# Patient Record
Sex: FEMALE | Race: WHITE | NOT HISPANIC OR LATINO | Employment: UNEMPLOYED | URBAN - METROPOLITAN AREA
[De-identification: names, ages, dates, MRNs, and addresses within clinical notes are randomized per-mention and may not be internally consistent; named-entity substitution may affect disease eponyms.]

---

## 2021-10-16 LAB — GROUP B STREPTOCOCCUS (EXTERNAL): NEGATIVE

## 2021-10-20 LAB — HEPATITIS C ANTIBODY (EXTERNAL): NEGATIVE

## 2021-11-06 ENCOUNTER — HOSPITAL ENCOUNTER (INPATIENT)
Facility: CLINIC | Age: 21
LOS: 1 days | Discharge: HOME | End: 2021-11-07
Attending: ADVANCED PRACTICE MIDWIFE | Admitting: MIDWIFE
Payer: COMMERCIAL

## 2021-11-06 ENCOUNTER — ANESTHESIA EVENT (OUTPATIENT)
Dept: OBGYN | Facility: CLINIC | Age: 21
End: 2021-11-06
Payer: COMMERCIAL

## 2021-11-06 ENCOUNTER — ANESTHESIA (OUTPATIENT)
Dept: OBGYN | Facility: CLINIC | Age: 21
End: 2021-11-06
Payer: COMMERCIAL

## 2021-11-06 PROBLEM — Z34.90 PREGNANCY: Status: ACTIVE | Noted: 2021-11-06

## 2021-11-06 PROBLEM — Z36.89 ENCOUNTER FOR TRIAGE IN PREGNANT PATIENT: Status: ACTIVE | Noted: 2021-11-06

## 2021-11-06 LAB
ABO/RH(D): NORMAL
ALBUMIN MFR UR ELPH: <7 MG/DL
ALT SERPL W P-5'-P-CCNC: <9 U/L (ref 0–45)
ANTIBODY SCREEN: NEGATIVE
AST SERPL W P-5'-P-CCNC: 14 U/L (ref 0–40)
CREAT SERPL-MCNC: 0.69 MG/DL (ref 0.6–1.1)
CREAT UR-MCNC: 21 MG/DL
ERYTHROCYTE [DISTWIDTH] IN BLOOD BY AUTOMATED COUNT: 14.4 % (ref 10–15)
GFR SERPL CREATININE-BSD FRML MDRD: >90 ML/MIN/1.73M2
HBV SURFACE AG SERPL QL IA: NONREACTIVE
HCT VFR BLD AUTO: 37 % (ref 35–47)
HGB BLD-MCNC: 12.3 G/DL (ref 11.7–15.7)
HIV 1+2 AB+HIV1 P24 AG SERPL QL IA: NEGATIVE
MCH RBC QN AUTO: 34.5 PG (ref 26.5–33)
MCHC RBC AUTO-ENTMCNC: 33.2 G/DL (ref 31.5–36.5)
MCV RBC AUTO: 104 FL (ref 78–100)
PLATELET # BLD AUTO: 223 10E3/UL (ref 150–450)
PROT/CREAT 24H UR: NORMAL MG/G{CREAT}
RBC # BLD AUTO: 3.57 10E6/UL (ref 3.8–5.2)
RUPTURE OF FETAL MEMBRANES BY ROM PLUS: POSITIVE
SARS-COV-2 RNA RESP QL NAA+PROBE: NEGATIVE
SPECIMEN EXPIRATION DATE: NORMAL
WBC # BLD AUTO: 10.3 10E3/UL (ref 4–11)

## 2021-11-06 PROCEDURE — 250N000009 HC RX 250: Performed by: ANESTHESIOLOGY

## 2021-11-06 PROCEDURE — 87340 HEPATITIS B SURFACE AG IA: CPT | Performed by: MIDWIFE

## 2021-11-06 PROCEDURE — 84460 ALANINE AMINO (ALT) (SGPT): CPT | Performed by: MIDWIFE

## 2021-11-06 PROCEDURE — 84156 ASSAY OF PROTEIN URINE: CPT | Performed by: MIDWIFE

## 2021-11-06 PROCEDURE — 84450 TRANSFERASE (AST) (SGOT): CPT | Performed by: MIDWIFE

## 2021-11-06 PROCEDURE — 370N000003 HC ANESTHESIA WARD SERVICE

## 2021-11-06 PROCEDURE — 3E033VJ INTRODUCTION OF OTHER HORMONE INTO PERIPHERAL VEIN, PERCUTANEOUS APPROACH: ICD-10-PCS | Performed by: MIDWIFE

## 2021-11-06 PROCEDURE — 722N000001 HC LABOR CARE VAGINAL DELIVERY SINGLE

## 2021-11-06 PROCEDURE — 120N000001 HC R&B MED SURG/OB

## 2021-11-06 PROCEDURE — 86780 TREPONEMA PALLIDUM: CPT | Performed by: MIDWIFE

## 2021-11-06 PROCEDURE — 86900 BLOOD TYPING SEROLOGIC ABO: CPT | Performed by: MIDWIFE

## 2021-11-06 PROCEDURE — C9803 HOPD COVID-19 SPEC COLLECT: HCPCS

## 2021-11-06 PROCEDURE — 250N000013 HC RX MED GY IP 250 OP 250 PS 637: Performed by: MIDWIFE

## 2021-11-06 PROCEDURE — 10907ZC DRAINAGE OF AMNIOTIC FLUID, THERAPEUTIC FROM PRODUCTS OF CONCEPTION, VIA NATURAL OR ARTIFICIAL OPENING: ICD-10-PCS | Performed by: MIDWIFE

## 2021-11-06 PROCEDURE — 87635 SARS-COV-2 COVID-19 AMP PRB: CPT | Performed by: MIDWIFE

## 2021-11-06 PROCEDURE — 250N000011 HC RX IP 250 OP 636: Performed by: ANESTHESIOLOGY

## 2021-11-06 PROCEDURE — 84112 EVAL AMNIOTIC FLUID PROTEIN: CPT | Performed by: MIDWIFE

## 2021-11-06 PROCEDURE — 82565 ASSAY OF CREATININE: CPT | Performed by: MIDWIFE

## 2021-11-06 PROCEDURE — 258N000003 HC RX IP 258 OP 636: Performed by: MIDWIFE

## 2021-11-06 PROCEDURE — 250N000009 HC RX 250: Performed by: MIDWIFE

## 2021-11-06 PROCEDURE — 250N000011 HC RX IP 250 OP 636: Performed by: MIDWIFE

## 2021-11-06 PROCEDURE — 86762 RUBELLA ANTIBODY: CPT | Performed by: MIDWIFE

## 2021-11-06 PROCEDURE — 36415 COLL VENOUS BLD VENIPUNCTURE: CPT | Performed by: MIDWIFE

## 2021-11-06 PROCEDURE — 87389 HIV-1 AG W/HIV-1&-2 AB AG IA: CPT | Performed by: MIDWIFE

## 2021-11-06 PROCEDURE — 85027 COMPLETE CBC AUTOMATED: CPT | Performed by: MIDWIFE

## 2021-11-06 RX ORDER — METOCLOPRAMIDE HYDROCHLORIDE 5 MG/ML
10 INJECTION INTRAMUSCULAR; INTRAVENOUS EVERY 6 HOURS PRN
Status: DISCONTINUED | OUTPATIENT
Start: 2021-11-06 | End: 2021-11-06 | Stop reason: HOSPADM

## 2021-11-06 RX ORDER — NALOXONE HYDROCHLORIDE 0.4 MG/ML
0.2 INJECTION, SOLUTION INTRAMUSCULAR; INTRAVENOUS; SUBCUTANEOUS
Status: DISCONTINUED | OUTPATIENT
Start: 2021-11-06 | End: 2021-11-06 | Stop reason: HOSPADM

## 2021-11-06 RX ORDER — METOCLOPRAMIDE 10 MG/1
10 TABLET ORAL EVERY 6 HOURS PRN
Status: DISCONTINUED | OUTPATIENT
Start: 2021-11-06 | End: 2021-11-06 | Stop reason: HOSPADM

## 2021-11-06 RX ORDER — OXYTOCIN 10 [USP'U]/ML
10 INJECTION, SOLUTION INTRAMUSCULAR; INTRAVENOUS
Status: DISCONTINUED | OUTPATIENT
Start: 2021-11-06 | End: 2021-11-07 | Stop reason: HOSPADM

## 2021-11-06 RX ORDER — PROCHLORPERAZINE 25 MG
25 SUPPOSITORY, RECTAL RECTAL EVERY 12 HOURS PRN
Status: DISCONTINUED | OUTPATIENT
Start: 2021-11-06 | End: 2021-11-06 | Stop reason: HOSPADM

## 2021-11-06 RX ORDER — OXYTOCIN/0.9 % SODIUM CHLORIDE 30/500 ML
100-340 PLASTIC BAG, INJECTION (ML) INTRAVENOUS CONTINUOUS PRN
Status: DISCONTINUED | OUTPATIENT
Start: 2021-11-06 | End: 2021-11-07 | Stop reason: HOSPADM

## 2021-11-06 RX ORDER — NALOXONE HYDROCHLORIDE 0.4 MG/ML
0.4 INJECTION, SOLUTION INTRAMUSCULAR; INTRAVENOUS; SUBCUTANEOUS
Status: DISCONTINUED | OUTPATIENT
Start: 2021-11-06 | End: 2021-11-06 | Stop reason: HOSPADM

## 2021-11-06 RX ORDER — NALBUPHINE HYDROCHLORIDE 10 MG/ML
2.5-5 INJECTION, SOLUTION INTRAMUSCULAR; INTRAVENOUS; SUBCUTANEOUS EVERY 6 HOURS PRN
Status: DISCONTINUED | OUTPATIENT
Start: 2021-11-06 | End: 2021-11-06

## 2021-11-06 RX ORDER — KETOROLAC TROMETHAMINE 30 MG/ML
30 INJECTION, SOLUTION INTRAMUSCULAR; INTRAVENOUS
Status: DISCONTINUED | OUTPATIENT
Start: 2021-11-06 | End: 2021-11-07 | Stop reason: HOSPADM

## 2021-11-06 RX ORDER — ACETAMINOPHEN 325 MG/1
650 TABLET ORAL EVERY 4 HOURS PRN
Status: DISCONTINUED | OUTPATIENT
Start: 2021-11-06 | End: 2021-11-06 | Stop reason: HOSPADM

## 2021-11-06 RX ORDER — LIDOCAINE 40 MG/G
CREAM TOPICAL
Status: DISCONTINUED | OUTPATIENT
Start: 2021-11-06 | End: 2021-11-06 | Stop reason: HOSPADM

## 2021-11-06 RX ORDER — NALOXONE HYDROCHLORIDE 0.4 MG/ML
0.4 INJECTION, SOLUTION INTRAMUSCULAR; INTRAVENOUS; SUBCUTANEOUS
Status: DISCONTINUED | OUTPATIENT
Start: 2021-11-06 | End: 2021-11-07 | Stop reason: HOSPADM

## 2021-11-06 RX ORDER — MODIFIED LANOLIN
OINTMENT (GRAM) TOPICAL
Status: DISCONTINUED | OUTPATIENT
Start: 2021-11-06 | End: 2021-11-07 | Stop reason: HOSPADM

## 2021-11-06 RX ORDER — OXYTOCIN/0.9 % SODIUM CHLORIDE 30/500 ML
1-24 PLASTIC BAG, INJECTION (ML) INTRAVENOUS CONTINUOUS
Status: DISCONTINUED | OUTPATIENT
Start: 2021-11-06 | End: 2021-11-06 | Stop reason: HOSPADM

## 2021-11-06 RX ORDER — NALBUPHINE HYDROCHLORIDE 10 MG/ML
2.5-5 INJECTION, SOLUTION INTRAMUSCULAR; INTRAVENOUS; SUBCUTANEOUS EVERY 6 HOURS PRN
Status: DISCONTINUED | OUTPATIENT
Start: 2021-11-06 | End: 2021-11-07 | Stop reason: HOSPADM

## 2021-11-06 RX ORDER — ACETAMINOPHEN 325 MG/1
650 TABLET ORAL EVERY 4 HOURS PRN
Status: DISCONTINUED | OUTPATIENT
Start: 2021-11-06 | End: 2021-11-07 | Stop reason: HOSPADM

## 2021-11-06 RX ORDER — DOCUSATE SODIUM 100 MG/1
100 CAPSULE, LIQUID FILLED ORAL DAILY
Status: DISCONTINUED | OUTPATIENT
Start: 2021-11-06 | End: 2021-11-07 | Stop reason: HOSPADM

## 2021-11-06 RX ORDER — ONDANSETRON 4 MG/1
4 TABLET, ORALLY DISINTEGRATING ORAL EVERY 6 HOURS PRN
Status: DISCONTINUED | OUTPATIENT
Start: 2021-11-06 | End: 2021-11-06 | Stop reason: HOSPADM

## 2021-11-06 RX ORDER — HYDROCORTISONE 2.5 %
CREAM (GRAM) TOPICAL 3 TIMES DAILY PRN
Status: DISCONTINUED | OUTPATIENT
Start: 2021-11-06 | End: 2021-11-07 | Stop reason: HOSPADM

## 2021-11-06 RX ORDER — SODIUM CHLORIDE, SODIUM LACTATE, POTASSIUM CHLORIDE, CALCIUM CHLORIDE 600; 310; 30; 20 MG/100ML; MG/100ML; MG/100ML; MG/100ML
10-125 INJECTION, SOLUTION INTRAVENOUS CONTINUOUS
Status: DISCONTINUED | OUTPATIENT
Start: 2021-11-06 | End: 2021-11-07 | Stop reason: HOSPADM

## 2021-11-06 RX ORDER — BISACODYL 10 MG
10 SUPPOSITORY, RECTAL RECTAL DAILY PRN
Status: DISCONTINUED | OUTPATIENT
Start: 2021-11-06 | End: 2021-11-07 | Stop reason: HOSPADM

## 2021-11-06 RX ORDER — IBUPROFEN 800 MG/1
800 TABLET, FILM COATED ORAL EVERY 6 HOURS PRN
Status: DISCONTINUED | OUTPATIENT
Start: 2021-11-06 | End: 2021-11-07 | Stop reason: HOSPADM

## 2021-11-06 RX ORDER — OXYCODONE HYDROCHLORIDE 5 MG/1
5 TABLET ORAL EVERY 4 HOURS PRN
Status: DISCONTINUED | OUTPATIENT
Start: 2021-11-06 | End: 2021-11-07 | Stop reason: HOSPADM

## 2021-11-06 RX ORDER — LIDOCAINE HYDROCHLORIDE AND EPINEPHRINE 15; 5 MG/ML; UG/ML
INJECTION, SOLUTION EPIDURAL PRN
Status: DISCONTINUED | OUTPATIENT
Start: 2021-11-06 | End: 2021-11-06

## 2021-11-06 RX ORDER — ONDANSETRON 2 MG/ML
4 INJECTION INTRAMUSCULAR; INTRAVENOUS EVERY 6 HOURS PRN
Status: DISCONTINUED | OUTPATIENT
Start: 2021-11-06 | End: 2021-11-06 | Stop reason: HOSPADM

## 2021-11-06 RX ORDER — NALOXONE HYDROCHLORIDE 0.4 MG/ML
0.2 INJECTION, SOLUTION INTRAMUSCULAR; INTRAVENOUS; SUBCUTANEOUS
Status: DISCONTINUED | OUTPATIENT
Start: 2021-11-06 | End: 2021-11-07 | Stop reason: HOSPADM

## 2021-11-06 RX ORDER — FENTANYL CITRATE 50 UG/ML
50-100 INJECTION, SOLUTION INTRAMUSCULAR; INTRAVENOUS
Status: DISCONTINUED | OUTPATIENT
Start: 2021-11-06 | End: 2021-11-06 | Stop reason: HOSPADM

## 2021-11-06 RX ORDER — FENTANYL CITRATE-0.9 % NACL/PF 10 MCG/ML
100 PLASTIC BAG, INJECTION (ML) INTRAVENOUS EVERY 5 MIN PRN
Status: DISCONTINUED | OUTPATIENT
Start: 2021-11-06 | End: 2021-11-06

## 2021-11-06 RX ORDER — FENTANYL/ROPIVACAINE/NS/PF 2MCG/ML-.1
PLASTIC BAG, INJECTION (ML) EPIDURAL
Status: DISCONTINUED | OUTPATIENT
Start: 2021-11-06 | End: 2021-11-06

## 2021-11-06 RX ORDER — PROCHLORPERAZINE MALEATE 10 MG
10 TABLET ORAL EVERY 6 HOURS PRN
Status: DISCONTINUED | OUTPATIENT
Start: 2021-11-06 | End: 2021-11-06 | Stop reason: HOSPADM

## 2021-11-06 RX ORDER — IBUPROFEN 600 MG/1
600 TABLET, FILM COATED ORAL
Status: DISCONTINUED | OUTPATIENT
Start: 2021-11-06 | End: 2021-11-07 | Stop reason: HOSPADM

## 2021-11-06 RX ORDER — FENTANYL CITRATE-0.9 % NACL/PF 10 MCG/ML
100 PLASTIC BAG, INJECTION (ML) INTRAVENOUS EVERY 5 MIN PRN
Status: DISCONTINUED | OUTPATIENT
Start: 2021-11-06 | End: 2021-11-06 | Stop reason: HOSPADM

## 2021-11-06 RX ORDER — BUPIVACAINE HYDROCHLORIDE 2.5 MG/ML
INJECTION, SOLUTION EPIDURAL; INFILTRATION; INTRACAUDAL PRN
Status: DISCONTINUED | OUTPATIENT
Start: 2021-11-06 | End: 2021-11-06

## 2021-11-06 RX ORDER — MISOPROSTOL 200 UG/1
TABLET ORAL
Status: DISCONTINUED
Start: 2021-11-06 | End: 2021-11-06 | Stop reason: WASHOUT

## 2021-11-06 RX ADMIN — DOCUSATE SODIUM 100 MG: 100 CAPSULE, LIQUID FILLED ORAL at 20:30

## 2021-11-06 RX ADMIN — Medication: at 15:21

## 2021-11-06 RX ADMIN — SODIUM CHLORIDE, POTASSIUM CHLORIDE, SODIUM LACTATE AND CALCIUM CHLORIDE 125 ML/HR: 600; 310; 30; 20 INJECTION, SOLUTION INTRAVENOUS at 15:20

## 2021-11-06 RX ADMIN — KETOROLAC TROMETHAMINE 30 MG: 30 INJECTION, SOLUTION INTRAMUSCULAR at 18:13

## 2021-11-06 RX ADMIN — LIDOCAINE HYDROCHLORIDE,EPINEPHRINE BITARTRATE 3 ML: 15; .005 INJECTION, SOLUTION EPIDURAL; INFILTRATION; INTRACAUDAL; PERINEURAL at 15:27

## 2021-11-06 RX ADMIN — BUPIVACAINE HYDROCHLORIDE 5 ML: 2.5 INJECTION, SOLUTION EPIDURAL; INFILTRATION; INTRACAUDAL at 16:23

## 2021-11-06 RX ADMIN — SODIUM CHLORIDE, POTASSIUM CHLORIDE, SODIUM LACTATE AND CALCIUM CHLORIDE 1000 ML: 600; 310; 30; 20 INJECTION, SOLUTION INTRAVENOUS at 14:23

## 2021-11-06 RX ADMIN — Medication 340 ML/HR: at 17:14

## 2021-11-06 RX ADMIN — LIDOCAINE HYDROCHLORIDE,EPINEPHRINE BITARTRATE 3 ML: 15; .005 INJECTION, SOLUTION EPIDURAL; INFILTRATION; INTRACAUDAL; PERINEURAL at 15:25

## 2021-11-06 RX ADMIN — IBUPROFEN 800 MG: 800 TABLET ORAL at 20:31

## 2021-11-06 ASSESSMENT — MIFFLIN-ST. JEOR: SCORE: 1713.43

## 2021-11-06 NOTE — PROGRESS NOTES
Labor Progress Note:        Assessment:     at 39w6d  Active labor  SROM and AROM of forebag (clear)    Plan:   -Prepare for epidural anesthesia  -Routine CNM support & management.   -Reevaluate progress in 2-3 hours or sooner with a change in status.   -Anticipate progress and NSVB.     Subjective:  Radha Chahal is having difficulty coping with ctx's and requests epidural. MDA currently @ bedside. Pt has been changing positions frequently over past several hours; hands/knees, lunge, side lying release.  Spouse- Bran and pt's mother at bedside and supportive.       Objective:  B/P: 113/62, T: 98.2, P: 67, R: 16     FHR:Baseline: 140 bpm, Variability: Moderate (6 - 25 bpm), Accelerations: present and Decelerations: Variable: none    Uterine contractions:TocoFrequency: Every 2-3 minutes, Duration: 60-90 seconds and Intensity: strong    SVE: 5 cm/90%/0, posterior, soft  AROM of forebag @  1411, clear fluid    Provider:  Carmel Bonilla CNM                    [facility]

## 2021-11-06 NOTE — PROGRESS NOTES
CNM completed speculum exam and visualized pooling.  Pt admitted to hospital for SROM.  CN will place orders for pitocin augmentation.  Pt would like to eat breakfast and tub before pitocin is started.

## 2021-11-06 NOTE — L&D DELIVERY NOTE
Vaginal Delivery Note    Name: Radha Chahal  : 2000  MRN: [id number]    PRE DELIVERY DIAGNOSIS  1) 21 year old  1 Para 0 at 39w6d      BROCK at 36 wk  Interested in WB  Elevated gct (142), normal 3 hr gtt  GBS negative  Low lying placenta- resolved  SROM- clear fluid, 0500 21  Hard of hearing; both ears.  Pt would like outpatient evaluation PP.  POST DELIVERY DIAGNOSIS  1) 21 year old  @ 39w6d  2) Delivery of a viable infant weighing   via     YOB: 2022     Birth Time: 5:04 PM       Augmentation Yes              Indication:   Induction No                      Indication:     Monitors: External     GBS: Negative    ROM: SROM  Fluid Type: Clear    Labor Analgesia/Anesthesia:Nitrous oxide and Epidural    Cord pH obtained: No  Placenta Date/Time: 2021  5:10 PM   Placenta submitted to Pathology: No    Description of procedure:   21 year old  with [facility] and pregnancy complicated by See list above presented to L&D with spontaneous rupture of membranes.  Her hospital course was uncomplicated.  Patient progressed to complete with AROM of forebag   Shoulder Dystocia No  Operative Vaginal Delivery No  Infant spontaneously delivered over an intact perineum and bilateral vaginal sidewall laceration.  It was repaired in the normal fashion using epidural anesthesia using 4-0 vicryl.  Infant delivered in DEBORAH position.  Nuchal cord No        Placenta spontaneously delivered: 2021  5:10 PM  grossly intact with 3 vessel cord.  Infant:  Live, vigorous infant  was handed to mom.    Delivery was complicated by nothing Interventions included fundal massage and pitocin.    Delivery QBL (mL): 150    Mother and Infant stable.    Carmel Bonilla CNM    2021 5:43 PM

## 2021-11-06 NOTE — ANESTHESIA PREPROCEDURE EVALUATION
Anesthesia Pre-Procedure Evaluation    Patient: Radha Chahal   MRN: [id number] : 2000        Preoperative Diagnosis: * No pre-op diagnosis entered *    Procedure : * No procedures listed *          Past Medical History:   Diagnosis Date     Hard of hearing     pt states she has been hard of hearing for 3 years      History reviewed. No pertinent surgical history.   No Known Allergies   Social History     Tobacco Use     Smoking status: Never Smoker     Smokeless tobacco: Never Used   Substance Use Topics     Alcohol use: Never      Wt Readings from Last 1 Encounters:   21 82.6 kg (182 lb)        Anesthesia Evaluation   Pt has had prior anesthetic.     No history of anesthetic complications       ROS/MED HX  ENT/Pulmonary:  - neg pulmonary ROS     Neurologic:  - neg neurologic ROS     Cardiovascular:  - neg cardiovascular ROS     METS/Exercise Tolerance:     Hematologic:  - neg hematologic  ROS     Musculoskeletal:  - neg musculoskeletal ROS     GI/Hepatic:  - neg GI/hepatic ROS     Renal/Genitourinary:  - neg Renal ROS     Endo:  - neg endo ROS     Psychiatric/Substance Use:  - neg psychiatric ROS     Infectious Disease:  - neg infectious disease ROS     Malignancy:  - neg malignancy ROS     Other:      (+) Possibly pregnant (Currently pregnant ), ,         Physical Exam    Airway  airway exam normal      Mallampati: II   TM distance: > 3 FB   Neck ROM: full   Mouth opening: > 3 cm    Respiratory Devices and Support         Dental  no notable dental history         Cardiovascular   cardiovascular exam normal       Rhythm and rate: regular and normal     Pulmonary   pulmonary exam normal        breath sounds clear to auscultation           OUTSIDE LABS:  CBC:   Lab Results   Component Value Date    WBC 10.3 2021    HGB 12.3 2021    HCT 37.0 2021     2021     BMP:   Lab Results   Component Value Date    CR 0.69 2021     COAGS: No results found for: PTT, INR,  FIBR  POC: No results found for: BGM, HCG, HCGS  HEPATIC:   Lab Results   Component Value Date    ALT <9 11/06/2021    AST 14 11/06/2021     OTHER: No results found for: PH, LACT, A1C, HANY, PHOS, MAG, LIPASE, AMYLASE, TSH, T4, T3, CRP, SED    Anesthesia Plan    ASA Status:  2   NPO Status:  NPO Appropriate    Anesthesia Type: Epidural.              Consents    Anesthesia Plan(s) and associated risks, benefits, and realistic alternatives discussed. Questions answered and patient/representative(s) expressed understanding.     - Discussed with:  Patient         Postoperative Care            Comments:                KAYLA GALLARDO MD

## 2021-11-06 NOTE — ANESTHESIA PROCEDURE NOTES
Epidural catheter Procedure Note    Pre-Procedure   Staff -        Anesthesiologist:  Cedric Anna MD       Performed By: anesthesiologist       Location: OB       Procedure Start/Stop Times: 11/6/2021 3:20 PM and 11/6/2021 3:27 PM       Pre-Anesthestic Checklist: patient identified, IV checked, risks and benefits discussed, informed consent, monitors and equipment checked, pre-op evaluation, at physician/surgeon's request and post-op pain management  Timeout:       Correct Patient: Yes        Correct Procedure: Yes        Correct Site: Yes        Correct Position: Yes   Procedure Documentation  Procedure: epidural catheter       Patient Position: sitting       Patient Prep/Sterile Barriers: sterile gloves, mask       Skin prep: Chloraprep       Local skin infiltrated with 3 mL of 1% lidocaine.        Insertion Site: L3-4. (midline approach).       Technique: LORT air        DERIK at 7 cm.       Needle type: Delphix.       Needle Gauge: 18.        Needle Length (Inches): 3.5        Catheter: 20 G.         Catheter threaded easily.         4.5 cm epidural space.         Threaded 11.5 cm at skin.         # of attempts: 1 and  # of redirects:  0    Assessment/Narrative         Paresthesias: No.      Test dose of 3 mL lidocaine 1.5% w/ 1:200,000 epinephrine at.         Test dose negative, 3 minutes after injection, for signs of intravascular, subdural, or intrathecal injection.       Insertion/Infusion Method: LORT air       No aspiration negative for Heme or CSF via Epidural Catheter.

## 2021-11-06 NOTE — PROGRESS NOTES
Bridgett is laboring in bathtub.  Reports feeling stronger ctx's.  Declines Pitocin at this time.  Will reassess in 1-2 hr.

## 2021-11-06 NOTE — PROGRESS NOTES
Spoke with KARLA Bonilla, she is on her way to hospital.  Plan to complete a rom plus and a VE. Have pt drink fluids.

## 2021-11-06 NOTE — PROGRESS NOTES
Labor Progress Note:        Assessment:     at 39w6d  SROM, 0500, clear  Moving into active labor      Plan:   -Routine CNM support & management.   -Reevaluate progress in 2-3 hours or sooner with a change in status.   -Anticipate progress and NSVB.     Subjective:  Radha Chahal is coping well with contractions. She declines VE or Pitocin augmentation at this time. Discussed check in 2-3 hr and she is agreeable with this.  Alexandra desires a natural childbirth/WB.  She is aware that the WB room is not available. She has been in and out of the tub in room several times today and is receiving good relief from water.  She is breathing through ctx's and reports that ctx's are getting stronger and closer together. Birth plan reviewed with couple    Objective:  B/P: 117/90, T: 97.7, P: 67, R: 16     FHR:Baseline: Via doppler- , no decles noted    Uterine contractions:Ctx's q 2-3 min, x 60-80 sec, moderate by palpation    SVE:deferred    Provider:  Carmel Bonilla CNM                    [facility]

## 2021-11-06 NOTE — PROGRESS NOTES
Pt doing well. She states ctx's feel stronger. She is in the tub, breathing through ctx's which palpate moderate in strength Q 3-5 mins. FHT's via doppler before during and after a ctx, baseline 145, acceleration up to 160 noted, no decels noted in FHR during auscultation.

## 2021-11-06 NOTE — PROGRESS NOTES
RN at bedside for 15 minutes following Nitrous Oxide 50/50 inhalation initiation. Patient is observed using the equipment appropriately. Patient appears to be coping with labor. Patient is free of side effects.    Juana Combs RN

## 2021-11-06 NOTE — PLAN OF CARE
Problem: Adult Inpatient Plan of Care  Goal: Plan of Care Review  Outcome: Improving     Problem: Adult Inpatient Plan of Care  Goal: Optimal Comfort and Wellbeing  Outcome: Improving

## 2021-11-06 NOTE — H&P
Date: 2021  Time: 9:15 AM    Admission H&P  Radha Chahal,  2000, MRN [id number]    [unfilled]  Encounter for triage in pregnant patient [Z36.89]    PCP: No Ref-Primary, Physician, None          Extended Emergency Contact Information  Primary Emergency Contact: ERIN CHAHAL  Home Phone: [phone]  Mobile Phone: [phone]  Relation: Spouse       Chief Complaint: pregnancy       HPI:      Radha Chahal, is a 21 year old,  at 39w6d, LMP 1/3/21, Estimated Date of Delivery: 2021, confirmed by ultrasound 1st trimester at [facility], admitted to [facility] on 2021 at approximately 0845 secondary to: SROM, ROM plus positive, ctx's q 6-7 min. Pt reports mild HA x 2 days. Denies vision changes or RUQ pain.   Prenatal History:  Radha Chahal received care with [facility]. Her care was complicated by BROCK from [facility] ([facility] system) at 36 wk, Interested in WB, pt is hard of hearing in both ears (has never had consult or evaluation for this, started 3 yr ago).        Admission on 2021   Component Date Value Ref Range Status     Rupture of Fetal Membranes by ROM * 2021 Positive* Negative, Invalid, Suggest Repeat Final       Pertinent Radiology:            OB HISTORY  OB History    Para Term  AB Living   1 0 0 0 0 0   SAB TAB Ectopic Multiple Live Births   0 0 0 0 0      # Outcome Date GA Lbr Cash/2nd Weight Sex Delivery Anes PTL Lv   1 Current                  Medical History       Surgical History  She  has no past surgical history on file.       Social History  Reviewed, and she  reports that she has never smoked. She has never used smokeless tobacco. She reports that she does not drink alcohol and does not use drugs.  Partner: Lovell        Family History  Reviewed, and family history is not on file.          No Known Allergies   No medications prior to admission.        Review of Systems:  Pertinent items are noted in HPI.  Patient  "denies  vision changes and/or RUQ pain.    Physical Exam:  Temp:  [97.7  F (36.5  C)] 97.7  F (36.5  C)  Pulse:  [67] 67  Resp:  [16] 16  BP: (126)/(73) 126/73    /73   Pulse 67   Temp 97.7  F (36.5  C) (Oral)   Resp 16   Ht 1.854 m (6' 1\")   Wt 82.6 kg (182 lb)   BMI 24.01 kg/m      Height: 6' 1\"  General appearance:  comfortable  Psych: AAO x3  Skin: Pink, warm & dry  HEENT: unremarkable  Cardiovascular:  RRR, S1, S2, no extra sounds or murmurs  Respiratory:  breath sounds CTA bilaterally, anteriorly and posteriorly  Abdomen: soft, NT, gravid  Leopolds: cephalic         EFW:<4500 grams (AGA)     FHR:Baseline: 130 bpm, Variability: Moderate (6 - 25 bpm), Accelerations: present and Decelerations: Absent    Uterine contractions: Frequency: Every 5-7 minutes, Duration: 60-80 seconds and Intensity: mild    SVE: 2 cm/60%/-1, mid, soft, stretchy  SROM- clear, blood tinged fluid, 0500 21    Extremeties: trace edema        Membrane Status:     Intact           Notable AP/IP factors to date:  BROCK at 36 wk  Interested in WB  Elevated gct (142), normal 3 hr gtt  GBS negative  Low lying placenta- resolved  SROM- clear fluid, 0500 21  Hard of hearing; both ears    Impression:  Radha Chahal is a 21 year old year old  at 39w6d weeks, Category 1 FHTs, SROM, not in active labor.      Plan:  1.  [facility]  2.  Encourage position changes  3.  Labor support PRN.  4.  Continuous/Intermittent fetal monitoring  5.  Consider active management of 3rd stage of labor  6.  Anticipate Spontaneous vaginal birth  7. Administer low-dose Pitocin  8. Slightly elevated Bps,  Will drawn PIH panel    [facility] available for consultation and collaboration as needed. Dr. Russell updated and available PRN.       Provider: Carmel Bonilla CNM  "

## 2021-11-06 NOTE — PROGRESS NOTES
pt of [facility] here complaining of contractions and light bleeding for 2.5 hours.  Cramping began yesterday evening.  Pt placed on monitor, cat 1 FHR with accelerations, contractions every 5-7 minutes.

## 2021-11-07 VITALS
BODY MASS INDEX: 24.65 KG/M2 | HEIGHT: 72 IN | TEMPERATURE: 98 F | SYSTOLIC BLOOD PRESSURE: 137 MMHG | WEIGHT: 182 LBS | OXYGEN SATURATION: 92 % | RESPIRATION RATE: 16 BRPM | HEART RATE: 80 BPM | DIASTOLIC BLOOD PRESSURE: 76 MMHG

## 2021-11-07 LAB — T PALLIDUM AB SER QL: NONREACTIVE

## 2021-11-07 PROCEDURE — 250N000013 HC RX MED GY IP 250 OP 250 PS 637: Performed by: MIDWIFE

## 2021-11-07 RX ORDER — DOCUSATE SODIUM 100 MG/1
100 CAPSULE, LIQUID FILLED ORAL DAILY
COMMUNITY
Start: 2021-11-08 | End: 2023-01-24

## 2021-11-07 RX ORDER — IBUPROFEN 600 MG/1
600 TABLET, FILM COATED ORAL
COMMUNITY
Start: 2021-11-07 | End: 2021-12-09

## 2021-11-07 RX ORDER — ACETAMINOPHEN 325 MG/1
650 TABLET ORAL EVERY 4 HOURS PRN
COMMUNITY
Start: 2021-11-07 | End: 2021-12-09

## 2021-11-07 RX ADMIN — Medication: at 17:19

## 2021-11-07 RX ADMIN — ACETAMINOPHEN 650 MG: 325 TABLET ORAL at 12:31

## 2021-11-07 RX ADMIN — DOCUSATE SODIUM 100 MG: 100 CAPSULE, LIQUID FILLED ORAL at 08:18

## 2021-11-07 RX ADMIN — ACETAMINOPHEN 650 MG: 325 TABLET ORAL at 00:31

## 2021-11-07 RX ADMIN — IBUPROFEN 800 MG: 800 TABLET ORAL at 12:31

## 2021-11-07 RX ADMIN — ACETAMINOPHEN 650 MG: 325 TABLET ORAL at 08:18

## 2021-11-07 RX ADMIN — IBUPROFEN 800 MG: 800 TABLET ORAL at 02:33

## 2021-11-07 RX ADMIN — IBUPROFEN 800 MG: 800 TABLET ORAL at 18:44

## 2021-11-07 RX ADMIN — IBUPROFEN 800 MG: 800 TABLET ORAL at 06:35

## 2021-11-07 NOTE — PROGRESS NOTES
"Vaginal Delivery Postpartum Day 1    Patient Name:  Radha Chahal  :      2000  MRN:      [id number]      Assessment:  Normal postpartum course. Desires discharge home today.    Plan:  Continue current care. Discharge at 24 hours.     Subjective:  The patient feels well:  Voiding without difficulty, lochia normal, tolerating normal diet, and passing flatus.  Pain is well controlled with current medications.  The patient has no emotional concerns.  The baby is well and being fed by breast.    Objective:  /67   Pulse 67   Temp 97.8  F (36.6  C) (Oral)   Resp 16   Ht 1.854 m (6' 1\")   Wt 82.6 kg (182 lb)   SpO2 92%   Breastfeeding Unknown   BMI 24.01 kg/m    Patient Vitals for the past 24 hrs:   BP Temp Temp src SpO2   21 0235 137/67 -- -- --   21 2212 123/69 -- -- --   21 1904 136/61 -- -- --   21 1848 95/68 -- -- --   21 1833 129/78 -- -- --   21 1818 113/59 -- -- --   21 1802 120/65 -- -- --   21 1748 117/70 -- -- --   21 1733 117/71 -- -- --   21 1717 107/58 -- -- --   21 1710 111/64 -- -- --   21 1704 -- -- -- 92 %   21 1700 -- -- -- 97 %   21 1625 113/63 -- -- --   21 1622 125/62 -- -- --   21 1621 -- -- -- 98 %   21 1620 115/58 -- -- --   21 1616 -- -- -- 99 %   21 1615 118/63 -- -- --   21 1611 -- -- -- 97 %   11/06/21 1610 116/65 -- -- --   21 1606 -- -- -- 98 %   21 1601 -- -- -- 96 %   21 1600 -- -- -- 93 %   21 1556 -- -- -- 96 %   21 1554 -- -- -- 93 %   21 1551 -- -- -- 97 %   21 1550 119/56 -- -- --   21 1546 -- -- -- 97 %   21 1545 127/82 -- -- --   21 1542 125/65 -- -- --   21 1541 -- -- -- 97 %   21 1540 119/67 -- -- --   21 1538 123/64 -- -- --   21 1536 110/58 97.8  F (36.6  C) Oral 97 %   21 1534 124/67 -- -- --   21 1532 123/76 -- -- --   21 1531 -- -- -- " 100 %   21 1530 122/74 -- -- --   21 1528 119/80 -- -- --   21 1526 116/79 -- -- 100 %   21 1524 126/77 -- -- --   21 1522 138/80 -- -- --   21 1521 -- -- -- 100 %   21 1520 128/82 -- -- --   21 1518 128/83 -- -- --   21 1516 -- -- -- 100 %   21 1511 -- -- -- 100 %   21 1409 -- 98.2  F (36.8  C) Oral --   21 1347 113/62 -- -- --   21 1300 -- 98.2  F (36.8  C) Oral --       The amount and color of the lochia is appropriate for the duration of recovery.  The uterine fundus is firm.  Urinary output is adequate.     Lab Results   Component Value Date    AS Negative 2021    HEPBANG Nonreactive 2021    HGB 12.3 2021       There is no immunization history for the selected administration types on file for this patient.    Provider:  Clementine Mcfadden CNM    Date:  2021  Time:  12:45 PM    Patient Name:  Radha Chahal  :  2000  MRN:  [id number]

## 2021-11-07 NOTE — ANESTHESIA POSTPROCEDURE EVALUATION
Patient: Radha Chahal    Procedure: * No procedures listed *       Diagnosis:* No pre-op diagnosis entered *  Diagnosis Additional Information: No value filed.    Anesthesia Type:  Epidural    Note:  Disposition: Inpatient   Postop Pain Control: Uneventful            Sign Out: Well controlled pain   PONV: No   Neuro/Psych: Uneventful            Sign Out: Acceptable/Baseline neuro status   Airway/Respiratory: Uneventful            Sign Out: Acceptable/Baseline resp. status   CV/Hemodynamics: Uneventful            Sign Out: Acceptable CV status; No obvious hypovolemia; No obvious fluid overload   Other NRE: NONE   DID A NON-ROUTINE EVENT OCCUR? No    Event details/Postop Comments:  Patient denies point back tenderness, positional headache and issues with urination/ambulation            Last vitals:  Vitals Value Taken Time   BP     Temp     Pulse     Resp     SpO2         Electronically Signed By: KAYLA GALLARDO MD  November 7, 2021  4:09 PM

## 2021-11-07 NOTE — DISCHARGE SUMMARY
OB Discharge Summary      Date:  2021    Name:  Radha Chahal  :  2000  MRN:  [id number]      Admission Date:  2021  Delivery Date: 21  Gestational Age at Delivery:  39w6d  Discharge Date:  2021    Principal Diagnosis:    Patient Active Problem List   Diagnosis     Encounter for triage in pregnant patient     Pregnancy       Other Diagnosis:      Conditions complicating Pregnancy:  BROCK at 36w  Bilateral hearing loss    Procedure(s) Performed:      Indication for :  N/A  Indication for Induction:  N/A     Condition at Discharge:  Stable    Discharge Medications:      Review of your medicines      START taking      Dose / Directions   acetaminophen 325 MG tablet  Commonly known as: TYLENOL  Used for: Care and examination of lactating mother      Dose: 650 mg  Take 2 tablets (650 mg) by mouth every 4 hours as needed for mild pain or fever (greater than or equal to 38  C /100.4  F (oral) or 38.5  C/ 101.4  F (core).)  Refills: 0     docusate sodium 100 MG capsule  Commonly known as: COLACE  Used for: Care and examination of lactating mother      Dose: 100 mg  Start taking on: 2021  Take 1 capsule (100 mg) by mouth daily  Refills: 0     ibuprofen 600 MG tablet  Commonly known as: ADVIL/MOTRIN  Used for: Care and examination of lactating mother      Dose: 600 mg  Take 1 tablet (600 mg) by mouth once as needed for other (For mild to moderate pain.)  Refills: 0           Where to get your medicines      Some of these will need a paper prescription and others can be bought over the counter. Ask your nurse if you have questions.    You don't need a prescription for these medications    acetaminophen 325 MG tablet    docusate sodium 100 MG capsule    ibuprofen 600 MG tablet         Discharge Plan:    Follow up with /KARLA:  At 6 weeks with [facility] provider   Patient Instructions:      Physical activity: As tolerated. Nothing in the vagina for 6 weeks.    Diet:   Regular    Medication:  As above    Other:        Physician/CNM:  Clementine Mcfadden CNM    Name:  Radha Chahal  :  2000  MRN:  [id number]

## 2021-11-08 LAB
RUBV IGG SERPL QL IA: 1.94 INDEX
RUBV IGG SERPL QL IA: POSITIVE

## 2021-12-08 RX ORDER — MECLIZINE HYDROCHLORIDE 25 MG/1
25 TABLET ORAL 3 TIMES DAILY PRN
COMMUNITY
Start: 2021-06-18 | End: 2021-12-09

## 2021-12-08 RX ORDER — NIFEDIPINE 10 MG/1
CAPSULE ORAL
COMMUNITY
Start: 2021-09-21 | End: 2021-12-09

## 2021-12-08 NOTE — PROGRESS NOTES
"Assessment:   1.  4 1/2 week old infant gaining weight well on exclusive breastfeeding  2.  Some difficulty with latch after initial milk letdown;  Able to re-latch with use of nipple shield.  Good suck and milk transfer in office today  3.  Mother with good milk supply and strong letdown reflex  4.  Mother with postpartum depression, beginning treatment    Plan:   1.  To continue to nurse baby on cue, 8-12 times each day.  Feed on one side until baby finishes swallowing.  Once swallowing slows, use breast compression to encourage more swallowing, but once there is no more active swallowing, and baby is either sleeping, coming off the breast, or just \"nibbling,\" it is OK to use a finger to take baby off the breast and move to the other breast.  Do the same on the other side.  Offer both breasts at each feeding.  It is more important to watch the baby than the clock!   2.  Present your breast in the \"sandwich\" hold, compressing breast vertically and in line with baby's mouth, for baby to get a larger mouthful of breast and a deeper latch.  If it is very difficult for Eduardo to latch and he is becoming very upset, it is OK to use the nipple shield to help him get on the breast.  Once he is nursing well and the milk is flowing, you can experiment with removing the shield.  You could also try removing it for the second side, once he is not so hungry.  3.  For the fast milk letdown that makes it difficult for Eduardo to stay latched to the breast, try using the laid-back nursing position.  You can also use one hand to gently block some milk ducts during letdown and help baby more easily cope with flow.  You can also try taking baby off of breast when the strong milk letdown starts, and allow milk to flow out into burp cloth, re-latching baby after flow has slowed.  Demonstrated laid-back and sidelying positions today.  4.  When you would like to add some pumping, it is OK to do that.  Just pump so that you have enough for a " bottle for Eduardo, around 3-4 oz.  Avoid pumping more than that, because this will just increase your supply more than you need and make the fast milk letdown worse.  Pumping after the first morning feeding is productive for many people.  5.  Look into pump options;  You can get a pump here at the [facility] or with your midwives at [facility].  6.  Continue the Lexapro that you have started, and follow up as planned with your midwife.  Consider the therapy options that they discussed with you.  Provided with local resources.  7.  Covid-19 vaccination does provide the baby with antibodies to Covid-19 through breastmilk, and will continue to do so through duration of breastfeeding, so you are doing a good job keeping Eduardo safe!  8.  See pediatric provider as planned, and lactation as needed. If urgent help is needed, Monday through Friday you can call [phone] and one of our lactation consultants will get the message and respond; if you need a rapid response over a weekend or holiday, it is best to call your on-call maternity or pediatric provider.  Please feel free to schedule a return visit if the concern is more detailed;  telephone visits are also an option if you don't feel you need to be seen in person.    Subjective: Bridgett is here today because of concerns about fast letdown--she reports that baby Eduardo detaches from breast and become distressed to the point that she began using nipple shields to help slow the flow.  This has been helpful, but she would like to move away from using the shields.  She has tried drawing off some extra milk using a [employer] passive pump, but this was not helpful. She also states that baby Eduardo latches much more easily to the right side;  Left nipple has tendency to retract.  Baby was also diagnosed with oral thrush, for which she is using nystatin, and has also been putting some nystatin cream on her nipples.  She has not been having any pain or itching.     Bridgett  also shares that she is having a hard time emotionally in the postpartum period.  Difficulty sleeping and eating.  She denies any suicidal ideation or thoughts of self-harm, although she admits that she has had episodes of depression with thoughts like this in the past.  Has never had medication therapy, but has recently started antidepressant therapy (Lexapro), prescribed by her CNM at [facility].  She is just beginning her second week of treatment.  Has also been referred to a postpartum support organization by her CNM.    Bridgett is vaccinated for Covid-19. She did also have the disease in March 2021.    Hospital Course:Spontaneous labor and uncomplicated birth.      Mother's Relevant Med/Surg History:  Hard of hearing bilaterally (never officially diagnosed/evaluated), depression    Breast Surgery: none    Breastfeeding Goals: continued exclusive breastfeeding    Previous Breastfeeding Experience: first baby    Infant's name: Humza  Infant's bday: [birth year]  Gestational age: 39w6d  Infant's birth weight: 7 # 8 oz    Mode of delivery: vaginal  Pediatric Provider: Dr. Concepción Pickard, [place]. Bridgett gives her permission for today's note to be forwarded to Dr. Pickard.  DANA signed and filed in Bridgett's chart as Humza has no local active pediatric chart.    Discharge weight: 7 # 1.4 oz  Recent weight 12/2/21:  9 # 3 oz      Frequency and duration of feedings: every 2-3 hours  Swallows audible per mother: yes  Numbers of feedings in 24 hours: 10  Number urines per day: 9  Number of stools per day and their color: 4, yellow watery    Supplementation: no  Pumping: no    Objective/Physical exam:   Mother: Noticed breasts grew larger and areolas darkened during pregnancy and she noticed primary engorgement when her milk came in on day 2-3  Her nipples are everted bilaterally, but the left nipple tends to draw in slightly at base.  The areola is compressible, the breast is soft and full.     Sore nipples: no  EPDS:  16.      Assessment of infant: 34.52% Weight for age percentile   Age today: 4 1/2 weeks  Today's weight: 9 # 8.6 oz  Amount of milk transferred from LEFT side: 2.6 oz  Amount of milk transferred from RIGHT side: 1 oz    Baby has full flexion of arms and legs, normal tone, behavior is alert and active, respirations are normal, skin is normal, hydration is normal, jaw is normal size and alignment, palate is normal, frenulum is normal, baby can lateralize tongue, has adequate tongue lift, and tongue can protrude past bottom gum line.    Suck exam:  Baby did not suck on examining finger;  Hungry and very distressed    Baby thrush: some present on back of tongue  Jaundice: none     Feeding assessment: Baby can hold suction with tongue while at the breast for the first part of the feeding.  After coming off the right breast, Eduardo had difficulty re-latching, crying and showing hunger cues but was unable to grasp the breast.  Nipple shield applied and moved into laid-back position;  Able to latch and complete feeding.  Also required nipple shield for left breast, with more retractile nipple.    Alignment: The baby was flex relaxed. Baby's head was aligned with its trunk. Baby did face mother. Baby was in cross cradle, sidelying and laid-back positions today.   Areolar Grasp: Baby was able to open mouth wide. Baby's lips were not pursed. Baby's lips did flange outward. Tongue was visible over bottom gum. Baby had complete seal.     Areolar Compression: Baby made rhythmic motion. There were no clicking or smacking sounds. There was no severe nipple discomfort. Nipples appeared rounded after feeding.    Audible swallowing: Baby made quiet sounds of swallowing: There was an increase in frequency after milk ejection reflex. The milk ejection reflex is strong and milk supply is normal.     /75 (BP Location: Left arm, Patient Position: Sitting, Cuff Size: Adult Regular)   OB History    Para Term  AB Living   1  1 1 0 0 1   SAB IAB Ectopic Multiple Live Births   0 0 0 0 1      # Outcome Date GA Lbr Cash/2nd Weight Sex Delivery Anes PTL Lv   1 Term 21 39w6d 11:30 / 00:34 3.402 kg (7 lb 8 oz) M Vag-Spont EPI, Nitrous N ALBERT      Name: ANA COHN-DARIUSZ      Apgar1: 8  Apgar5: 9       Current Outpatient Medications:      docusate sodium (COLACE) 100 MG capsule, Take 1 capsule (100 mg) by mouth daily, Disp: , Rfl:      escitalopram (LEXAPRO) 5 MG tablet, Take 2 tablets by mouth daily Take one tab every day for one week, then take 2 tabs every day if tolerated, Disp: , Rfl:      NIFEdipine (PROCARDIA) 10 MG capsule, , Disp: , Rfl:      nystatin (MYCOSTATIN) 692085 UNIT/GM external cream, Apply 1 Application topically 4 times daily, Disp: , Rfl:   Past Medical History:   Diagnosis Date     Hard of hearing     pt states she has been hard of hearing for 3 years     No past surgical history on file.  No family history on file.    Time spent:  Chart review/prechartin min prior to day of service  Face-to-face visit:   60 min   Documentation:  15 min   Total time spent on day of service: 75 min    Ramya Mackay, KLAUDIA, CNM, IBCLC

## 2021-12-09 ENCOUNTER — ALLIED HEALTH/NURSE VISIT (OUTPATIENT)
Dept: MIDWIFE SERVICES | Facility: CLINIC | Age: 21
End: 2021-12-09
Payer: COMMERCIAL

## 2021-12-09 VITALS — SYSTOLIC BLOOD PRESSURE: 110 MMHG | DIASTOLIC BLOOD PRESSURE: 75 MMHG

## 2021-12-09 DIAGNOSIS — O92.79 OTHER DISORDERS OF LACTATION: ICD-10-CM

## 2021-12-09 PROBLEM — O47.03 PRETERM UTERINE CONTRACTIONS, ANTEPARTUM, THIRD TRIMESTER: Status: ACTIVE | Noted: 2021-09-23

## 2021-12-09 PROBLEM — U07.1 LAB TEST POSITIVE FOR DETECTION OF COVID-19 VIRUS: Status: ACTIVE | Noted: 2021-03-31

## 2021-12-09 PROCEDURE — 99205 OFFICE O/P NEW HI 60 MIN: CPT | Performed by: ADVANCED PRACTICE MIDWIFE

## 2021-12-09 RX ORDER — ESCITALOPRAM OXALATE 5 MG/1
2 TABLET ORAL DAILY
COMMUNITY
Start: 2021-11-30 | End: 2023-01-24

## 2021-12-09 RX ORDER — NYSTATIN 100000 U/G
1 CREAM TOPICAL 4 TIMES DAILY
COMMUNITY
Start: 2021-11-12 | End: 2023-01-24

## 2021-12-09 ASSESSMENT — EDINBURGH POSTNATAL DEPRESSION SCALE (EPDS)
I HAVE BEEN SO UNHAPPY THAT I HAVE HAD DIFFICULTY SLEEPING: NOT VERY OFTEN
I HAVE BEEN SO UNHAPPY THAT I HAVE BEEN CRYING: ONLY OCCASIONALLY
I HAVE LOOKED FORWARD WITH ENJOYMENT TO THINGS: DEFINITELY LESS THAN I USED TO
I HAVE BEEN ANXIOUS OR WORRIED FOR NO GOOD REASON: YES, SOMETIMES
THINGS HAVE BEEN GETTING ON TOP OF ME: YES, SOMETIMES I HAVEN'T BEEN COPING AS WELL AS USUAL
THE THOUGHT OF HARMING MYSELF HAS OCCURRED TO ME: NEVER
I HAVE BLAMED MYSELF UNNECESSARILY WHEN THINGS WENT WRONG: YES, SOME OF THE TIME
I HAVE BEEN ABLE TO LAUGH AND SEE THE FUNNY SIDE OF THINGS: DEFINITELY NOT SO MUCH NOW
TOTAL SCORE: 16
I HAVE FELT SAD OR MISERABLE: YES, QUITE OFTEN
I HAVE FELT SCARED OR PANICKY FOR NO GOOD REASON: YES, SOMETIMES

## 2021-12-09 NOTE — PATIENT INSTRUCTIONS
"  1.  To continue to nurse baby on cue, 8-12 times each day.  Feed on one side until baby finishes swallowing.  Once swallowing slows, use breast compression to encourage more swallowing, but once there is no more active swallowing, and baby is either sleeping, coming off the breast, or just \"nibbling,\" it is OK to use a finger to take baby off the breast and move to the other breast.  Do the same on the other side.  Offer both breasts at each feeding.  It is more important to watch the baby than the clock!   2.  Present your breast in the \"sandwich\" hold, compressing breast vertically and in line with baby's mouth, for baby to get a larger mouthful of breast and a deeper latch.  If it is very difficult for Eduardo to latch and he is becoming very upset, it is OK to use the nipple shield to help him get on the breast.  Once he is nursing well and the milk is flowing, you can experiment with removing the shield.  You could also try removing it for the second side, once he is not so hungry.  3.  For the fast milk letdown that makes it difficult for Eduardo to stay latched to the breast, try using the laid-back nursing position.  You can also use one hand to gently block some milk ducts during letdown and help baby more easily cope with flow.  You can also try taking baby off of breast when the strong milk letdown starts, and allow milk to flow out into burp cloth, re-latching baby after flow has slowed.  4.  When you would like to add some pumping, it is OK to do that.  Just pump so that you have enough for a bottle for Eduardo, around 3-4 oz.  Avoid pumping more than that, because this will just increase your supply more than you need and make the fast milk letdown worse.  Pumping after the first morning feeding is productive for many people.  5.  Look into pump options;  You can get a pump here at the [facility] or with your midwives at [facility] [place].  6.  Continue the Lexapro that you have started, and follow " up as planned with your midwife.  Consider the therapy options that they discussed with you.  7.  Covid-19 vaccination does provide the baby with antibodies to Covid-19 through breastmilk, and will continue to do so through duration of breastfeeding, so you are doing a good job keeping Eduardo safe!  8.  See pediatric provider as planned, and lactation as needed. If urgent help is needed, Monday through Friday you can call [phone] and one of our lactation consultants will get the message and respond; if you need a rapid response over a weekend or holiday, it is best to call your on-call maternity or pediatric provider.  Please feel free to schedule a return visit if the concern is more detailed;  telephone visits are also an option if you don't feel you need to be seen in person.        Good breastfeeding resources:    Websites:  www.[employer]  Https://www.[employer]/blog/    Books:   The Baby Book: Dr. Schwartz and Padma Rob  The Womanly Art of Breastfeeding by [facility] Hospital[facility]    __________________________    For weaning from nipple shield, try without the shield when your baby is calm and willing to eat, but not frantic.  Sometimes it works best when they are sleepy, or even nearly asleep. Try some feedings starting without the shield and then moving to using it to finish the feeding, and try some starting the feeding with the shield and then taking it off once baby is more satisfied and calm and the milk is flowing well.   Some feedings may go well without the shield, and some not--don't panic!   Sometimes weaning from the shield can take a few weeks.  Be patient, because it is almost always successful in the end.    Here is an excellent article that goes through some steps for weaning from the shield:    https://www.[employer]/blog/my-ten-step-process-for-weaning-from-the-nipple-shield/      _______________     Strategies to Reduce Milk Ejection Force  When a mother produces a large  "volume of milk, her milk ejection reflex will be stronger. All that milk rushing down the ducts may be more than baby can handle. It's like trying to drink from a garden hose that is turned on full-blast, while lying down on your back. In addition to choking, sputtering, and coughing when the milk comes out, baby may try to control the milk flow by pulling away and adapting a shallow latch. Shallow latching can be very painful for a mother. Or he may simply clamp down in an attempt to slow the flow, resulting in a \"biting\" sensation. Baby may also scream and arch his back to let you know that something needs to be changed.    It is sometimes recommended that mothers who have oversupply or an overactive milk ejection (let-down) hand-express or pump an ounce or so of milk prior to feeding to help slow the milk flow. However, this practice tends to be counterproductive, because removing that much milk from the breasts increases milk production, which in turn will increase the force of flow even more.    There are many strategies that can help manage a forceful milk ejection without increasing milk production. Placing the baby in a more upright position, so that his head is higher than the breast, will allow him more control during the feeding. Or position yourself so that you are leaning backwards, with the baby almost on top of you after he latches. In this position the milk has to flow \"uphill,\" which will reduce the force of your milk ejection reflex.    Some mothers find that a \"scissors\" hold on the areola, with the nipple between the forefinger and middle finger, helps restrict the flow of milk. Another option is to apply pressure on the side of your breast with the heel of your hand. (Try to vary the position of your hand to avoid constricting the ducts and inadvertently causing a plug.) You may also find it helpful to breastfeed just as your baby is waking from naps. He will suck more gently when he is still " sleepy.    If baby starts to choke/sputter, unlatch him and let the excess milk spray into a towel or cloth. Relatch him when the force of the milk ejection has lessened.  Many mothers with oversupply find that nursing in a side-lying position makes feedings go more smoothly because milk flows from the breast horizontally without the force of gravity and babies can let excess milk dribble downward from their mouths rather than having to swallow it all. (Place a towel under you and baby to absorb the extra milk.) Use a rolled-up receiving blanket against your baby's back to keep him from rolling away from you. If you are still lying down together at the next feeding and are ready to offer the other breast, you can just roll with your baby onto your other side, so that the second breast is against the bed and not flowing downhill with increasing force.    You may find that the side-lying position is somewhat difficult to master and that it is not as easy to get a good latch when lying down. Sometimes it helps to place baby with nipple pointing at his nose so that his neck is extended and he is looking up toward the breast as he latches. Some mothers latch baby onto the breast while sitting up and then slowly slide down into a side-lying position while holding baby gently but firmly so he stays attached. One great benefit of learning to nurse lying down is that you can drift off to sleep while your baby nurses. Don't worry that your baby will have difficulty breathing; babies choose air over food. So long as there are no pillows or blankets around his head, he will be able to move his head freely when he is finished nursing. He may rest his head against your breast as if it were a pillow. (Note that mothers with very large, pillowy breasts need to take special care that baby has room to move his head.) For more information about the side-lying position, see our (forthcoming) Side-Lying FAQ.    Some babies cope with  their mothers' strong milk ejection by taking only a little milk at a time, stopping and starting frequently. It is almost as if they are enjoying several courses to their meal. This is absolutely fine; allow him to come on and off the breast as he needs to, making sure to keep offering your baby the same breast for each course so that he has the opportunity to get the cream.    Although it can be tempting to stretch out feedings when your nipples are sore, feeding baby before he gets extremely hungry will keep him from sucking too aggressively, which not only hurts when nipples are already sore, but can cause further nipple damage. An overly strong suck can also cause a stronger milk ejection, making the feeding more difficult.      ____________    Postpartum Emotional Support/Mental Health Resources:    Postpartum Support [place]:  Https://www.[facility]ort[facility].org/get_help   Helpline:  960.153.8576;  Email:  Reshma@[employer]    [facility] Mother-Baby Program   [employer]:  037-637-FGLV:  Leave message, will call back within 2 days   Availability of multiple types of therapy programs    [facility] Services:  Postpartum mental health services offered free of charge to Medicaid clients.  Offering video visits, and some in-office or in-home visits.   Phone:  [phone]   Email:  info@[employer]   Website:  [employer]      Emergency Resource Phone Numbers for [place]:    Crisis Connection:  288.382.4488   National Suicide Prevention Line:  0-090-225-SJBK   [facility] Crisis Program:  885.955.6829   [facility] Crisis Line:  126.347.3449   [facility] Crisis Line:  492.751.2425   [facility] Crisis Line:  690.318.2103   [facility] Crisis Line:  601.739.2818   [facility] Services:  [phone]   [facility] Crisis Line:  [phone]   [facility]:  1-382.727.5028

## 2021-12-23 ENCOUNTER — TRANSFERRED RECORDS (OUTPATIENT)
Dept: HEALTH INFORMATION MANAGEMENT | Facility: CLINIC | Age: 21
End: 2021-12-23

## 2022-01-04 ENCOUNTER — OFFICE VISIT - RIVER FALLS (OUTPATIENT)
Dept: FAMILY MEDICINE | Facility: CLINIC | Age: 22
End: 2022-01-04

## 2022-03-02 NOTE — NURSING NOTE
Comprehensive Intake Entered On:  1/4/2022 1:30 PM CST    Performed On:  1/4/2022 1:28 PM CST by Lila Olvera CMA               Summary   Chief Complaint :   Verbal consent given for telephone visit. St, HA, fatigue, runny nose since yesterday. Pt's  recently positive for influenza, negative COVID. Sx started 1/3. No known expsoures to COVID.    Lila Olvera CMA - 1/4/2022 1:28 PM CST   Health Status   Allergies Verified? :   Yes   Medication History Verified? :   Yes   Pre-Visit Planning Status :   Not completed   Tobacco Use? :   Never smoker   Lila Olvera CMA - 1/4/2022 1:28 PM CST   Meds / Allergies   (As Of: 1/4/2022 1:30:50 PM CST)   Allergies (Active)   No Known Medication Allergies  Estimated Onset Date:   Unspecified ; Created By:   Lila Olvera CMA; Reaction Status:   Active ; Category:   Drug ; Substance:   No Known Medication Allergies ; Type:   Allergy ; Updated By:   Lila Olvera CMA; Reviewed Date:   1/4/2022 1:28 PM CST        Medication List   (As Of: 1/4/2022 1:30:50 PM CST)   Home Meds    escitalopram  :   escitalopram ; Status:   Documented ; Ordered As Mnemonic:   Lexapro 20 mg oral tablet ; Simple Display Line:   20 mg, 1 tab(s), Oral, daily, 90 tab(s), 0 Refill(s) ; Catalog Code:   escitalopram ; Order Dt/Tm:   1/4/2022 1:29:02 PM CST            Social History   Social History   (As Of: 1/4/2022 1:30:50 PM CST)   Tobacco:        Never (less than 100 in lifetime)   (Last Updated: 1/4/2022 1:29:25 PM CST by Lila Olvera CMA)          Electronic Cigarette/Vaping:        Electronic Cigarette Use: Never.   (Last Updated: 1/4/2022 1:29:28 PM CST by Lila Olvera CMA)

## 2022-03-02 NOTE — PROGRESS NOTES
Patient:   DARIUSZ COHN            MRN: [id number]            FIN: [id number]               Age:   21 years     Sex:  Female     :  2000   Associated Diagnoses:   Influenza   Author:   Antione Castillo MD      Visit Information      Date of Service: 2022 12:24 pm  Performing Location: [facility]  Encounter#: [id number]      Primary Care Provider (PCP):  NONE ,       Referring Provider:  Antione Castillo MD    NPI# [id number]   Visit type:  Telephone Encounter.    Source of history:  Patient.    Location of patient:  Home  Location of Provider: Work  Call Start Time:   _210  Call End Time:    _215    Consent for virtual telephone visit obtained.      Chief Complaint   2022 1:28 PM CST     Verbal consent given for telephone visit. St, HA, fatigue, runny nose since yesterday. Pt's  recently positive for influenza, negative COVID. Sx started 1/3. No known expsoures to COVID.     _      History of Present Illness   Today's visit was conducted via telephone due to the COVID-19 pandemic. Patient's consent to telephone visit was obtained and documented.      Reason for visit:  _    Patient calls because of symptoms of flu.  Her  was diagnosed with influenza  yesterday.  He had a negative Covid test.   She over the last days developed  sore throat headache fatigue  runny nose  body aches.   She is breast-feeding  her infant is 2 months old         Impression and Plan   Diagnosis     Influenza ([id number] J11.1).     Plan:  Given 's history patient most likely has influenza we discussed using Tamiflu and risk it should be safe with breast-feeding.  She will start on for 5 days twice a day.  Warned her of side effects.  Follow-up in a few days if not improving she will force  .       Health Status   Allergies:    Allergic Reactions (Selected)  No Known Medication Allergies   Medications:  (Selected)   Prescriptions  Prescribed  Tamiflu 75 mg oral capsule: = 1 cap(s) (  75 mg ), PO, BID, x 5 day(s), # 10 cap(s), 0 Refill(s), Type: Acute, [facility], 1 cap(s) Oral bid,x5 day(s)  Documented Medications  Documented  Lexapro 20 mg oral tablet: = 1 tab(s) ( 20 mg ), Oral, daily, # 90 tab(s), 0 Refill(s), Type: Maintenance   Problem list:    No problem items selected or recorded.      Histories   Past Medical History:    No active or resolved past medical history items have been selected or recorded.   Family History:    No family history items have been selected or recorded.   Procedure history:    No active procedure history items have been selected or recorded.   Social History:        Electronic Cigarette/Vaping Assessment            Electronic Cigarette Use: Never.      Tobacco Assessment            Never (less than 100 in lifetime)

## 2022-03-07 ENCOUNTER — MEDICAL CORRESPONDENCE (OUTPATIENT)
Dept: HEALTH INFORMATION MANAGEMENT | Facility: CLINIC | Age: 22
End: 2022-03-07
Payer: COMMERCIAL

## 2022-03-09 ENCOUNTER — OFFICE VISIT (OUTPATIENT)
Dept: FAMILY MEDICINE | Facility: CLINIC | Age: 22
End: 2022-03-09
Payer: COMMERCIAL

## 2022-03-09 VITALS
HEIGHT: 72 IN | WEIGHT: 153.2 LBS | TEMPERATURE: 97.8 F | DIASTOLIC BLOOD PRESSURE: 62 MMHG | SYSTOLIC BLOOD PRESSURE: 100 MMHG | BODY MASS INDEX: 20.75 KG/M2 | HEART RATE: 104 BPM | OXYGEN SATURATION: 97 %

## 2022-03-09 DIAGNOSIS — K58.2 IRRITABLE BOWEL SYNDROME WITH BOTH CONSTIPATION AND DIARRHEA: ICD-10-CM

## 2022-03-09 DIAGNOSIS — K13.79 MUCOCELE OF MOUTH: Primary | ICD-10-CM

## 2022-03-09 DIAGNOSIS — K59.04 CHRONIC IDIOPATHIC CONSTIPATION: ICD-10-CM

## 2022-03-09 PROCEDURE — 99213 OFFICE O/P EST LOW 20 MIN: CPT | Performed by: FAMILY MEDICINE

## 2022-03-09 RX ORDER — PREDNISONE 20 MG/1
3 TABLET ORAL DAILY
COMMUNITY
Start: 2022-02-28 | End: 2023-01-24

## 2022-03-09 RX ORDER — BUPROPION HYDROCHLORIDE 150 MG/1
150 TABLET ORAL DAILY
COMMUNITY
Start: 2022-02-22 | End: 2023-01-24

## 2022-03-09 RX ORDER — ESCITALOPRAM OXALATE 20 MG/1
20 TABLET ORAL DAILY
COMMUNITY
Start: 2022-02-08 | End: 2023-01-24

## 2022-03-09 NOTE — PROGRESS NOTES
"  Assessment & Plan   Problem List Items Addressed This Visit     None      Visit Diagnoses     Mucocele of mouth    -  Primary    Relevant Orders    Otolaryngology Referral    Chronic idiopathic constipation        Irritable bowel syndrome with both constipation and diarrhea        Relevant Medications    predniSONE (DELTASONE) 20 MG tablet       will have pt see my partner Dr. Meza to discuss her  IBS,     Referred  To ENT for mucocele    Return to clinic if symptoms worsen or do not improve if sx worsen or do not improve        {Provider  Link to [facility] Help Grid :680125}         Return for schedule an appointment with Willard Meza for your IBS.    Zuri Callaway MD  [facility]    Subjective here to have sores in her mouth looked at, present for the past 2 months, also request a referral to GI for possible Crohns  Bridgett is a 21 year old who presents for the following health issues Mouth Lesions    History of Present Illness       Reason for visit:  Lump in mouth and stomach problems  Symptom onset:  3-4 weeks ago  Symptoms include:  Pain in lower right side  Symptom intensity:  Moderate  Symptom progression:  Staying the same  Had these symptoms before:  Yes  Has tried/received treatment for these symptoms:  No  What makes it worse:  No  What makes it better:  No    She eats 2-3 servings of fruits and vegetables daily.She consumes 2 sweetened beverage(s) daily.She exercises with enough effort to increase her heart rate 20 to 29 minutes per day.  She exercises with enough effort to increase her heart rate 4 days per week.   She is taking medications regularly.     Hx IBS alternating constipation and diarrhea, now occ BRBPR, was seen by Rheum who recommended she see GI,           Review of Systems   HENT: Positive for mouth sores.             Objective    /62 (BP Location: Right arm, Patient Position: Sitting)   Pulse 104   Temp 97.8  F (36.6  C)   Ht 1.873 m (6' 1.75\")   " Wt 69.5 kg (153 lb 3.2 oz)   SpO2 97%   Breastfeeding Yes   BMI 19.80 kg/m    Body mass index is 19.8 kg/m .  Physical Exam       Exam:  General: alert and oriented ×3 no acute distress.    HEENT: pupils are equal round and reactive to light extraocular motion is intact. Normocephalic and atraumatic.     Hearing is grossly normal and there is no otorrhea.     Chest: has bilateral rise with no increased work of breathing.    Cardiovascular: normal perfusion and brisk capillary refill.    Musculoskeletal: no gross focal abnormalities and normal gait.    Neuro: no gross focal abnormalities and memory seems intact.    Psychiatric: speech is clear and coherent and fluent. Patient dressed appropriately for the weather. Mood is appropriate and affect is full.        Discussed with patient to return to clinic if symptoms worsen or do not improve

## 2022-03-23 ENCOUNTER — TELEPHONE (OUTPATIENT)
Dept: FAMILY MEDICINE | Facility: CLINIC | Age: 22
End: 2022-03-23

## 2022-03-23 ENCOUNTER — OFFICE VISIT (OUTPATIENT)
Dept: FAMILY MEDICINE | Facility: CLINIC | Age: 22
End: 2022-03-23
Payer: COMMERCIAL

## 2022-03-23 VITALS
HEIGHT: 72 IN | OXYGEN SATURATION: 98 % | SYSTOLIC BLOOD PRESSURE: 108 MMHG | DIASTOLIC BLOOD PRESSURE: 66 MMHG | BODY MASS INDEX: 21.59 KG/M2 | TEMPERATURE: 97.9 F | WEIGHT: 159.4 LBS | HEART RATE: 102 BPM

## 2022-03-23 DIAGNOSIS — R10.9 ABDOMINAL CRAMPING: Primary | ICD-10-CM

## 2022-03-23 DIAGNOSIS — K62.5 RECTAL BLEEDING: ICD-10-CM

## 2022-03-23 DIAGNOSIS — K58.1 IRRITABLE BOWEL SYNDROME WITH CONSTIPATION: ICD-10-CM

## 2022-03-23 LAB — TSH SERPL DL<=0.005 MIU/L-ACNC: 0.77 MU/L (ref 0.4–4)

## 2022-03-23 PROCEDURE — 99214 OFFICE O/P EST MOD 30 MIN: CPT | Performed by: EMERGENCY MEDICINE

## 2022-03-23 PROCEDURE — 84443 ASSAY THYROID STIM HORMONE: CPT | Performed by: EMERGENCY MEDICINE

## 2022-03-23 PROCEDURE — 36415 COLL VENOUS BLD VENIPUNCTURE: CPT | Performed by: EMERGENCY MEDICINE

## 2022-03-23 PROCEDURE — 86364 TISS TRNSGLTMNASE EA IG CLAS: CPT | Performed by: EMERGENCY MEDICINE

## 2022-03-23 RX ORDER — AMITRIPTYLINE HYDROCHLORIDE 10 MG/1
10 TABLET ORAL AT BEDTIME
Qty: 30 TABLET | Refills: 1 | Status: SHIPPED | OUTPATIENT
Start: 2022-03-23 | End: 2023-01-24

## 2022-03-23 RX ORDER — ONDANSETRON 4 MG/1
4 TABLET, ORALLY DISINTEGRATING ORAL EVERY 8 HOURS PRN
Qty: 30 TABLET | Refills: 1 | Status: SHIPPED | OUTPATIENT
Start: 2022-03-23 | End: 2023-01-24

## 2022-03-23 NOTE — LETTER
March 24, 2022      Bridgett Chahal  [address] [address]        Dear ,    We are writing to inform you of your test results.    Thyroid test is normal.  No evidence of celiac disease.    Resulted Orders   Tissue transglutaminase marguerite IgA and IgG   Result Value Ref Range    Tissue Transglutaminase Antibody IgA 0.3 <7.0 U/mL      Comment:      Negative- The tTG-IgA assay has limited utility for patients with decreased levels of IgA. Screening for celiac disease should include IgA testing to rule out selective IgA deficiency and to guide selection and interpretation of serological testing. tTG-IgG testing may be positive in celiac disease patients with IgA deficiency.    Tissue Transglutaminase Antibody IgG 0.7 <7.0 U/mL      Comment:      Negative   TSH   Result Value Ref Range    TSH 0.77 0.40 - 4.00 mU/L       If you have any questions or concerns, please call the clinic at the number listed above.       Sincerely,      Bob Meza MD

## 2022-03-23 NOTE — TELEPHONE ENCOUNTER
Reason for Call:  Other-Update for Provider    Detailed comments: Patient is stating food isnt fully digesting she is stating when she has a bowel movement there are fully pieces of food in them. She forgot to tell dr small this in the appointment.     Phone Number Patient can be reached at: Work number on file:  [phone]    Best Time: any    Can we leave a detailed message on this number? YES    Call taken on 3/23/2022 at 1:31 PM by Alla Calabrese

## 2022-03-23 NOTE — PROGRESS NOTES
History of Present Illness:  Radha Chahal is a 21 year old female    Has always had stomach problems and diagnosed with irritable bowel syndrome since age 8. Has had more problems since having a baby 4 months ago. Now has constant nausea with cramping in her low right abdomen. Also has constant knot in her epigastric region since delivered baby. Has been alternating between constipation and diarrhea. Has been recently (past week) having blood on toilet paper and dripping into water. Had also had a couple times with blood mixed in stool. Has noticed hemorrhoids from the pregnancy. Also has discomfort across her lower abdomen. Constipated her whole pregnancy. Year prior to pregnancy had normal symptoms. Would miss bowel movement for 2 days and then go normal for a few days with the cramping.     Has tried miralax, cutting out dairy, high fiber diets without much success in past. Has seen an autoimmune doctor in past.  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 Graduated 2020. Grew up [facility]. Parents moved to [place] and so she moved to [place] with .  (Bran) December 2020.    Review of Systems:  No vomiting  No fevers    No DVT/PE  No sleep apnea  No problems with anesthesia    Vaginal delivery November 2021      Current Outpatient Medications   Medication Sig Dispense Refill     buPROPion (WELLBUTRIN XL) 150 MG 24 hr tablet Take 150 mg by mouth daily       docusate sodium (COLACE) 100 MG capsule Take 1 capsule (100 mg) by mouth daily       escitalopram (LEXAPRO) 20 MG tablet Take 20 mg by mouth daily       escitalopram (LEXAPRO) 5 MG tablet Take 2 tablets by mouth daily Take one tab every day for one week, then take 2 tabs every day if tolerated (Patient not taking: Reported on 3/23/2022)       nystatin (MYCOSTATIN) 425082 UNIT/GM external cream Apply 1 Application topically 4 times daily (Patient not taking: Reported on 3/23/2022)       predniSONE (DELTASONE) 20 MG tablet Take 3 tablets by mouth daily  "(Patient not taking: Reported on 3/23/2022)         Past Medical History:   Diagnosis Date     Hard of hearing     pt states she has been hard of hearing for 3 years     No past surgical history on file.      /66 (BP Location: Right arm)   Pulse 102   Temp 97.9  F (36.6  C) (Tympanic)   Ht 1.873 m (6' 1.75\")   Wt 72.3 kg (159 lb 6.4 oz)   SpO2 98%   BMI 20.60 kg/m    General: no acute distress  Musculoskeletal: normal gait  Heart: Regular rate and rhythm with mild tachycardia  Lungs: Clear  Abdomen: Soft and nondistended.  Mild discomfort with palpation.  Alvarado sign is negative  Rectal: no masses or lesions. No anal fissure or obvious hemorrhoids    Assessment and Plan:    Irritable bowel syndrome: We will trial amitriptyline and Zofran which were both acceptable with breast-feeding. FODMAPs information given. Also discussed trial of zyrtec 20mg daily for 2 weeks   Rectal bleeding: Discussed colonoscopy the risks and benefits and will proceed with scheduling.  Rule out celiac disease: We will start with blood testing.  Consider doing an EGD as well at some point in the near future.          "

## 2022-03-23 NOTE — TELEPHONE ENCOUNTER
Talked with patient and some foods undigested. Lost weight after pregnancy but has stabilized. Will follow up with Colonoscopy

## 2022-03-24 LAB
TTG IGA SER-ACNC: 0.3 U/ML
TTG IGG SER-ACNC: 0.7 U/ML

## 2022-04-19 DIAGNOSIS — R10.13 ABDOMINAL PAIN, EPIGASTRIC: Primary | ICD-10-CM

## 2022-05-03 ENCOUNTER — MEDICAL CORRESPONDENCE (OUTPATIENT)
Dept: HEALTH INFORMATION MANAGEMENT | Facility: CLINIC | Age: 22
End: 2022-05-03
Payer: COMMERCIAL

## 2022-05-29 ENCOUNTER — HEALTH MAINTENANCE LETTER (OUTPATIENT)
Age: 22
End: 2022-05-29

## 2022-10-01 ENCOUNTER — OFFICE VISIT (OUTPATIENT)
Dept: URGENT CARE | Facility: URGENT CARE | Age: 22
End: 2022-10-01
Payer: COMMERCIAL

## 2022-10-01 VITALS
OXYGEN SATURATION: 97 % | DIASTOLIC BLOOD PRESSURE: 71 MMHG | TEMPERATURE: 97.8 F | WEIGHT: 159 LBS | BODY MASS INDEX: 20.55 KG/M2 | HEART RATE: 96 BPM | SYSTOLIC BLOOD PRESSURE: 113 MMHG | RESPIRATION RATE: 18 BRPM

## 2022-10-01 DIAGNOSIS — R21 RASH: Primary | ICD-10-CM

## 2022-10-01 PROCEDURE — 99213 OFFICE O/P EST LOW 20 MIN: CPT | Performed by: FAMILY MEDICINE

## 2022-10-01 RX ORDER — LORATADINE 10 MG/1
10 TABLET ORAL DAILY
Qty: 30 TABLET | Refills: 0 | Status: SHIPPED | OUTPATIENT
Start: 2022-10-01 | End: 2023-01-24

## 2022-10-01 RX ORDER — TRIAMCINOLONE ACETONIDE 1 MG/G
OINTMENT TOPICAL 2 TIMES DAILY
Qty: 30 G | Refills: 0 | Status: SHIPPED | OUTPATIENT
Start: 2022-10-01 | End: 2023-01-24

## 2022-10-01 NOTE — PROGRESS NOTES
Assessment & Plan     Rash  Patient with itching that circumferential around the left side but also does involve underneath the breast on the right side.  There were a total of 5 small 1 to 2 mm red areas but no signs of active infection.  Because it crosses the midline I do not think shingles is starting.  We will treat her skin with some steroid cream along with antihistamines and see if her symptoms resolve or if other symptoms surface we can reevaluate  - triamcinolone (KENALOG) 0.1 % external ointment; Apply topically 2 times daily  - loratadine (CLARITIN) 10 MG tablet; Take 1 tablet (10 mg) by mouth daily        No follow-ups on file.    Leodan Mosqueda MD  [facility]    Nico Urias is a 22 year old, presenting for the following health issues:  Breast Problem (Mastitis both breasts x 2-3 days, breasts are itchy not only when feeding but all the time)      HPI           Review of Systems         Objective    /71   Pulse 96   Temp 97.8  F (36.6  C) (Oral)   Resp 18   Wt 72.1 kg (159 lb)   SpO2 97%   BMI 20.55 kg/m    Body mass index is 20.55 kg/m .  Physical Exam  Vitals and nursing note reviewed.   Constitutional:       Appearance: Normal appearance.   Pulmonary:      Effort: Pulmonary effort is normal.   Skin:     General: Skin is warm.      Comments: There are several small erythematous lesions very slightly raised some on the left back and then 1 underneath the left breast and then to underneath the right breast no adenopathy no open skin   Neurological:      Mental Status: She is alert.

## 2022-10-03 ENCOUNTER — HEALTH MAINTENANCE LETTER (OUTPATIENT)
Age: 22
End: 2022-10-03

## 2022-10-21 ENCOUNTER — TRANSFERRED RECORDS (OUTPATIENT)
Dept: HEALTH INFORMATION MANAGEMENT | Facility: CLINIC | Age: 22
End: 2022-10-21

## 2022-10-21 ENCOUNTER — MEDICAL CORRESPONDENCE (OUTPATIENT)
Dept: HEALTH INFORMATION MANAGEMENT | Facility: CLINIC | Age: 22
End: 2022-10-21

## 2022-10-26 ENCOUNTER — MEDICAL CORRESPONDENCE (OUTPATIENT)
Dept: HEALTH INFORMATION MANAGEMENT | Facility: CLINIC | Age: 22
End: 2022-10-26

## 2022-10-27 ENCOUNTER — TRANSCRIBE ORDERS (OUTPATIENT)
Dept: OTHER | Age: 22
End: 2022-10-27

## 2022-10-27 DIAGNOSIS — H90.3 BILATERAL SENSORINEURAL HEARING LOSS: Primary | ICD-10-CM

## 2022-11-08 ENCOUNTER — ALLIED HEALTH/NURSE VISIT (OUTPATIENT)
Dept: FAMILY MEDICINE | Facility: CLINIC | Age: 22
End: 2022-11-08
Payer: COMMERCIAL

## 2022-11-08 DIAGNOSIS — Z23 NEED FOR INFLUENZA VACCINATION: Primary | ICD-10-CM

## 2022-11-08 PROCEDURE — 90471 IMMUNIZATION ADMIN: CPT

## 2022-11-08 PROCEDURE — 99207 PR NO CHARGE NURSE ONLY: CPT

## 2022-11-08 PROCEDURE — 90686 IIV4 VACC NO PRSV 0.5 ML IM: CPT

## 2023-01-16 DIAGNOSIS — Z01.10 ENCOUNTER FOR HEARING TEST: Primary | ICD-10-CM

## 2023-01-24 ENCOUNTER — PRE VISIT (OUTPATIENT)
Dept: OTOLARYNGOLOGY | Facility: CLINIC | Age: 23
End: 2023-01-24

## 2023-01-24 ENCOUNTER — OFFICE VISIT (OUTPATIENT)
Dept: AUDIOLOGY | Facility: CLINIC | Age: 23
End: 2023-01-24
Attending: OTOLARYNGOLOGY
Payer: COMMERCIAL

## 2023-01-24 ENCOUNTER — OFFICE VISIT (OUTPATIENT)
Dept: OTOLARYNGOLOGY | Facility: CLINIC | Age: 23
End: 2023-01-24
Payer: COMMERCIAL

## 2023-01-24 VITALS
DIASTOLIC BLOOD PRESSURE: 62 MMHG | WEIGHT: 167 LBS | OXYGEN SATURATION: 98 % | HEART RATE: 86 BPM | SYSTOLIC BLOOD PRESSURE: 95 MMHG | HEIGHT: 72 IN | TEMPERATURE: 98.4 F | BODY MASS INDEX: 22.62 KG/M2

## 2023-01-24 DIAGNOSIS — H90.3 BILATERAL SENSORINEURAL HEARING LOSS: ICD-10-CM

## 2023-01-24 DIAGNOSIS — H93.13 TINNITUS, BILATERAL: Primary | ICD-10-CM

## 2023-01-24 DIAGNOSIS — H90.3 SENSORY HEARING LOSS, BILATERAL: Primary | ICD-10-CM

## 2023-01-24 PROCEDURE — 99204 OFFICE O/P NEW MOD 45 MIN: CPT | Performed by: OTOLARYNGOLOGY

## 2023-01-24 PROCEDURE — 92550 TYMPANOMETRY & REFLEX THRESH: CPT | Performed by: AUDIOLOGIST

## 2023-01-24 PROCEDURE — 92557 COMPREHENSIVE HEARING TEST: CPT | Performed by: AUDIOLOGIST

## 2023-01-24 ASSESSMENT — PAIN SCALES - GENERAL: PAINLEVEL: NO PAIN (0)

## 2023-01-24 NOTE — PATIENT INSTRUCTIONS
You were seen in the ENT Clinic today by Dr. Baptiste. If you have any questions or concerns after your appointment, please contact us (see below)      2.   Please return to clinic in 6 months with an audiogram.  3.   A genetics referral will be placed and they will reach out to you to get scheduled.     How to Contact Us:  Send a [employer] message to your provider. Our team will respond to you via [employer]. Occasionally, we will need to call you to get further information.  For urgent matters (Monday-Friday), call the ENT Clinic: [phone] and speak with a call center team member - they will route your call appropriately.   If you'd like to speak directly with a nurse, please find our contact information below. We do our best to check voicemail frequently throughout the day, and will work to call you back within 1-2 days. For urgent matters, please use the general clinic phone numbers listed above.      Allegra MEJIA RN  ENT RN Care Coordinator  Direct: [phone]    Georgette ALDRICH LPN  Direct: [phone]

## 2023-01-24 NOTE — NURSING NOTE
"Chief Complaint   Patient presents with     Consult     Bilateral hearing loss and tinnitus      Blood pressure 95/62, pulse 86, temperature 98.4  F (36.9  C), height 1.88 m (6' 2\"), weight 75.8 kg (167 lb), SpO2 98 %, currently breastfeeding.    Ranjith Miller LPN  v  "

## 2023-01-24 NOTE — PROGRESS NOTES
AUDIOLOGY REPORT    SUMMARY: Audiology visit completed. See audiogram for results.      RECOMMENDATIONS: Follow-up with ENT.    Ursula Saucedo, [facility]  Licensed Audiologist  [place] License #[id number]

## 2023-01-24 NOTE — LETTER
2023       RE: Radha Chahal  [address] [facility] [address]     Dear Colleague,    Thank you for referring your patient, Radha Chahal, to the [facility] [place] [facility]. Please see a copy of my visit note below.      Neurotology Clinic      Name: Radha Chahal  MRN: [id number]  Age: 22 year old  : 2000  Referring provider: Margoth Blanco  2023      Chief Complaint:   Consultation    History of Present Illness:   Radha Chahal is a 22 year old female with a history of migraines who presents for consultation regarding bilateral sensorineural hearing loss, tinnitus, and ear pressure.  Consultation was requested by Margoth Blanco. The patient has had decreased hearing along with bilateral tinnitus and ear pressure for about 2 years which started during pregnancy. She may have had symptoms prior to her pregnancy although this became much more pronounced after becoming pregnant and she denies hearing issues in her teenage years. Her  mentions that he did not have to repeat himself as often before they were  two years ago as he does now. Her hearing was gradually worsening during her pregnancy but became more noticeable after giving birth. She first noticed this when a sensor was going off and playing a song which her  could hear but she did not know this was going off. Her ears intermittently pop which improves her hearing for up to 5 minutes but this then reverts back to her baseline diminished hearing. She does not have fluctuations of improvement or worsening of hearing which last for a full day. She has increased difficulty speaking on the phone over blue tooth in the car or on speaker phone with background noise. Talking with the phone to her ear is easier for her to understand. She denies vertigo but mentions she sometimes experiences dizziness when she stands up and has to  sit back down. She denies any daily medications or history of pain medications. Denies history of TB or malaria treatment or IV antibiotics. Denies history of cardiac, kidney, or thyroid issues. She had been on birth control from age 13 until a few months before she had her son about 2 years ago but there were no new changes with this recently. She has had follow-up with [facility] for management of her symptoms and at her recent appointment in 2022 it was found that her hearing was worsening on audiogram with no clear cause. Her aunt has a history of hearing loss of the majority of her life which worsened when she was in her 50's due to an autoimmune disorder and now has a CI. She was therefore referred for genetic testing although has not consulted with them yet. She denies a family history of hearing loss in her siblings, parents, or other family besides her mother's sister. She has completed a hearing aid trial through an audiology clinic separate from [facility] but has not ordered these yet. She tried to wear these for the 30 day trial although found them uncomfortable and they caused migraines so she would often take them out.      Review of Systems:   Pertinent items are noted in HPI or as in patient entered ROS below, remainder of complete ROS is negative.    ENT ROS 2023   Neurology: Headache   Ears, Nose, Throat: Hearing loss, Ear pain, Ringing/noise in ears        Active Medications:   No current outpatient medications on file.      Allergies:   Patient has no known allergies.      Past Medical History:  Past Medical History:   Diagnosis Date     Hard of hearing     pt states she has been hard of hearing for 3 years     Patient Active Problem List   Diagnosis     Encounter for triage in pregnant patient     Pregnancy     Tension headache     SOB (shortness of breath) on exertion      uterine contractions, antepartum, third trimester     Migraine with aura, not intractable, without  "status migrainosus     Lab test positive for detection of COVID-19 virus     Dysmenorrhea     Concussion        Past Surgical History:  No past surgical history on file.    Family History:   No family history on file.      Social History:   Social History     Tobacco Use     Smoking status: Never     Smokeless tobacco: Never   Vaping Use     Vaping Use: Never used   Substance Use Topics     Alcohol use: Never     Drug use: Never        Physical Exam:   BP 95/62   Pulse 86   Temp 98.4  F (36.9  C)   Ht 1.88 m (6' 2\")   Wt 75.8 kg (167 lb)   SpO2 98%   BMI 21.44 kg/m         Constitutional:  The patient was accompanied, well-groomed, and in no acute distress.     Skin: Normal:  warm and pink without rash   Neurologic: Alert and oriented x 3.  CN's III-XII within normal limits.  Voice normal.    Psychiatric: The patient's affect was calm, cooperative, and appropriate.     Communication:  Normal; communicates verbally, normal voice quality.   Respiratory: Breathing comfortably without stridor or exertion of accessory muscles.    Head/Face:  No lesions or scars. No sinus tenderness.    Salivary glands -  Normal size, no tenderness, swelling, or palpable masses   Eyes: Pupils were equal and reactive.  Extraocular movement intact.     Ears: Pinnae and tragus non-tender.  EAC's and TM's were clear.        Otologic otoscope exam:  Right ear: EAC and TM clear   Left ear: EAC and TM clear      Audiogram:  AUDIOGRAM: She underwent an audiogram today. This demonstrated:      Right: Speech reception threshold is 30 dB with 64% word recognition. Tympanogram A type   Left: Speech reception threshold is 30 dB with 56% word recognition. Tympanogram A type     Audiogram was independently reviewed    Imaging:  MR MEJIA 1/4/22  IMPRESSION:   1.  Normal MRI of the internal auditory canals and labyrinthine structures.   2.  Unremarkable brain MRI with no finding for acute infarct, no parenchymal signal abnormality, and no abnormal " parenchymal enhancement.    Outside records from [facility] were independently reviewed.       Assessment and Plan:    Bilateral progressive sensorineural hearing loss     Radha Chahal is a 22 year old female with a history of migraines who presents for consultation regarding a several year history of steadily progressive bilateral sensorineural hearing loss, tinnitus, and ear pressure. I reviewed the results of her audiogram which shows hearing worse in the left compared to the right with overall reduced word understanding and high frequency deficits. Given her workup and history, without identifiable ototoxic exposures, I do suspect that there is a genetic component to her hearing loss and offered a referral to our genetic testing team which she is agreeable with.  I do not see evidence of a reversible etiology.  There are qualities to her speech production that suggest that hearing loss may have been present for many years to some degree but has certainly progressed as of late with significant loss of high frequencies and word understanding.     We would plan to follow the course of the hearing loss with next audiogram in 6 months and annually if stable.    I encourage hearing aid use. She should have benefit from hearing aids although hearing will not seem normal. I encouraged her to continue working towards adapting to the hearing aids as they could improve her quality of life and she may need to work with her primary care team for migraine management as well as that seems to be a factor limiting use. I also offered a referral to our audiology team but she is comfortable continuing follow-up with her local audiology team. I briefly discussed option of CI in the future if there is further progression although she is not a candidate for this intervention at this time.     I recommend her to obtain updated audiograms every 6 months to a year which she can obtain closer to home and follow-up via  virtual visit. Genetic counseling referral was also placed.    Scribe Disclosure:  I, Nathaly Heath, am serving as a scribe to document services personally performed by Elvira Baptiste MD at this visit, based upon the provider's statements to me. All documentation has been reviewed by the aforementioned provider prior to being entered into the official medical record.       The documentation recorded by the scribe accurately reflects the services I personally performed and the decisions made by me.    Elvira Baptiste MD  Otology & Neurotology  [facility]

## 2023-01-24 NOTE — PROGRESS NOTES
Neurotology Clinic      Name: Radha Chahal  MRN: [id number]  Age: 22 year old  : 2000  Referring provider: Margoth Blanco  2023      Chief Complaint:   Consultation    History of Present Illness:   Radha Chahal is a 22 year old female with a history of migraines who presents for consultation regarding bilateral sensorineural hearing loss, tinnitus, and ear pressure.  Consultation was requested by Margoth Blanco. The patient has had decreased hearing along with bilateral tinnitus and ear pressure for about 2 years which started during pregnancy. She may have had symptoms prior to her pregnancy although this became much more pronounced after becoming pregnant and she denies hearing issues in her teenage years. Her  mentions that he did not have to repeat himself as often before they were  two years ago as he does now. Her hearing was gradually worsening during her pregnancy but became more noticeable after giving birth. She first noticed this when a sensor was going off and playing a song which her  could hear but she did not know this was going off. Her ears intermittently pop which improves her hearing for up to 5 minutes but this then reverts back to her baseline diminished hearing. She does not have fluctuations of improvement or worsening of hearing which last for a full day. She has increased difficulty speaking on the phone over blue tooth in the car or on speaker phone with background noise. Talking with the phone to her ear is easier for her to understand. She denies vertigo but mentions she sometimes experiences dizziness when she stands up and has to sit back down. She denies any daily medications or history of pain medications. Denies history of TB or malaria treatment or IV antibiotics. Denies history of cardiac, kidney, or thyroid issues. She had been on birth control from age 13 until a few months before she had her son about 2 years ago but there were no new changes  with this recently. She has had follow-up with [facility] for management of her symptoms and at her recent appointment in 2022 it was found that her hearing was worsening on audiogram with no clear cause. Her aunt has a history of hearing loss of the majority of her life which worsened when she was in her 50's due to an autoimmune disorder and now has a CI. She was therefore referred for genetic testing although has not consulted with them yet. She denies a family history of hearing loss in her siblings, parents, or other family besides her mother's sister. She has completed a hearing aid trial through an audiology clinic separate from [facility] but has not ordered these yet. She tried to wear these for the 30 day trial although found them uncomfortable and they caused migraines so she would often take them out.      Review of Systems:   Pertinent items are noted in HPI or as in patient entered ROS below, remainder of complete ROS is negative.    ENT ROS 2023   Neurology: Headache   Ears, Nose, Throat: Hearing loss, Ear pain, Ringing/noise in ears        Active Medications:   No current outpatient medications on file.      Allergies:   Patient has no known allergies.      Past Medical History:  Past Medical History:   Diagnosis Date     Hard of hearing     pt states she has been hard of hearing for 3 years     Patient Active Problem List   Diagnosis     Encounter for triage in pregnant patient     Pregnancy     Tension headache     SOB (shortness of breath) on exertion      uterine contractions, antepartum, third trimester     Migraine with aura, not intractable, without status migrainosus     Lab test positive for detection of COVID-19 virus     Dysmenorrhea     Concussion        Past Surgical History:  No past surgical history on file.    Family History:   No family history on file.      Social History:   Social History     Tobacco Use     Smoking status: Never     Smokeless tobacco: Never  "  Vaping Use     Vaping Use: Never used   Substance Use Topics     Alcohol use: Never     Drug use: Never        Physical Exam:   BP 95/62   Pulse 86   Temp 98.4  F (36.9  C)   Ht 1.88 m (6' 2\")   Wt 75.8 kg (167 lb)   SpO2 98%   BMI 21.44 kg/m         Constitutional:  The patient was accompanied, well-groomed, and in no acute distress.     Skin: Normal:  warm and pink without rash   Neurologic: Alert and oriented x 3.  CN's III-XII within normal limits.  Voice normal.    Psychiatric: The patient's affect was calm, cooperative, and appropriate.     Communication:  Normal; communicates verbally, normal voice quality.   Respiratory: Breathing comfortably without stridor or exertion of accessory muscles.    Head/Face:  No lesions or scars. No sinus tenderness.    Salivary glands -  Normal size, no tenderness, swelling, or palpable masses   Eyes: Pupils were equal and reactive.  Extraocular movement intact.     Ears: Pinnae and tragus non-tender.  EAC's and TM's were clear.        Otologic otoscope exam:  Right ear: EAC and TM clear   Left ear: EAC and TM clear      Audiogram:  AUDIOGRAM: She underwent an audiogram today. This demonstrated:      Right: Speech reception threshold is 30 dB with 64% word recognition. Tympanogram A type   Left: Speech reception threshold is 30 dB with 56% word recognition. Tympanogram A type     Audiogram was independently reviewed    Imaging:  MR MEJIA 1/4/22  IMPRESSION:   1.  Normal MRI of the internal auditory canals and labyrinthine structures.   2.  Unremarkable brain MRI with no finding for acute infarct, no parenchymal signal abnormality, and no abnormal parenchymal enhancement.    Outside records from [facility] were independently reviewed.       Assessment and Plan:    Bilateral progressive sensorineural hearing loss     Radha Chahal is a 22 year old female with a history of migraines who presents for consultation regarding a several year history of steadily " progressive bilateral sensorineural hearing loss, tinnitus, and ear pressure. I reviewed the results of her audiogram which shows hearing worse in the left compared to the right with overall reduced word understanding and high frequency deficits. Given her workup and history, without identifiable ototoxic exposures, I do suspect that there is a genetic component to her hearing loss and offered a referral to our genetic testing team which she is agreeable with.  I do not see evidence of a reversible etiology.  There are qualities to her speech production that suggest that hearing loss may have been present for many years to some degree but has certainly progressed as of late with significant loss of high frequencies and word understanding.     We would plan to follow the course of the hearing loss with next audiogram in 6 months and annually if stable.    I encourage hearing aid use. She should have benefit from hearing aids although hearing will not seem normal. I encouraged her to continue working towards adapting to the hearing aids as they could improve her quality of life and she may need to work with her primary care team for migraine management as well as that seems to be a factor limiting use. I also offered a referral to our audiology team but she is comfortable continuing follow-up with her local audiology team. I briefly discussed option of CI in the future if there is further progression although she is not a candidate for this intervention at this time.     I recommend her to obtain updated audiograms every 6 months to a year which she can obtain closer to home and follow-up via virtual visit. Genetic counseling referral was also placed.    Scribe Disclosure:  I, Nathaly Heath, am serving as a scribe to document services personally performed by Elvira Baptiste MD at this visit, based upon the provider's statements to me. All documentation has been reviewed by the aforementioned provider prior to being  entered into the official medical record.       The documentation recorded by the scribe accurately reflects the services I personally performed and the decisions made by me.    Elvira Baptiste MD  Otology & Neurotology  [facility]

## 2023-01-25 ENCOUNTER — TELEPHONE (OUTPATIENT)
Dept: CONSULT | Facility: CLINIC | Age: 23
End: 2023-01-25

## 2023-01-26 NOTE — TELEPHONE ENCOUNTER
Spoke with patient and assisted in scheduling appointment.     Future Appointments   Date Time Provider Department Center   2/3/2023 11:00 AM Rekha Moody [facility]

## 2023-01-30 ENCOUNTER — TELEPHONE (OUTPATIENT)
Dept: FAMILY MEDICINE | Facility: CLINIC | Age: 23
End: 2023-01-30

## 2023-02-02 NOTE — PROGRESS NOTES
"Bridgett is a 22 year old who is being evaluated via a billable video visit.      How would you like to obtain your AVS? MyChart  If the video visit is dropped, the invitation should be resent by: Text to cell phone: [phone]  Will anyone else be joining your video visit? Yvonne Dueñas    GENETIC COUNSELING CONSULTATION NOTE    Date of visit: 02/03/23    Presenting Information:   Radha \"Bridgett\" Fela is a 22 year old female referred to the [facility] due to bilateral sensorineural hearing loss and tinnitus. She was seen for a genetic counseling appointment at the request of her ENT physician Dr. Baptiste today.    Per Dr. Saldana's evaluation with Bridgett on 1/24/23, Bridgett has had decreased hearing along with bilateral tinnitus and ear pressure for about 2 years which started during pregnancy. She may have had symptoms prior to her pregnancy although this became much more pronounced after becoming pregnant and she denies hearing issues in her teenage years. Bridgett reports that she noticed a change when she had a harder time hearing and distinguishing lyrics when listening to music. Please refer to Dr. Baptiste note for further details of Bridgett's history that was gathered at this visit.     Bridgett denies any history of infection, cardiac, kidney, or thyroid issues. No vision concerns, does not wear glasses. Bridgett reports that she has had a sensitive stomach since around age 8 and has IBS. She reports that she had some kidney pain during her pregnancy with her son. She thinks she may have had a kidney ultrasound but could not recall and it has not been a concern again since her pregnancy.    Birth History:   No reported complications or known maternal exposures or illnesses during the pregnancy with Bridgett. Her mother told her she had a very normal pregnancy.     Imaging:  MR MEJIA 1/4/22  IMPRESSION:   1.  Normal MRI of the internal auditory canals and labyrinthine structures.   2. "  Unremarkable brain MRI with no finding for acute infarct, no parenchymal signal abnormality, and no abnormal parenchymal enhancement.    Family History: A three generation pedigree was obtained and scanned into the electronic medical record. The relevant portions are described below:      Children-     One son, 15 months old, who is healthy. He passed his [place]    Siblings-     Sister, age 28 (identical twin), who is healthy and has three children: a 6 month old daughter who is healthy, a 2 year old son who is healthy, and a 5 year old son who was born with a hold in his heart or a collapsed valve he is otherwise health now    Sister, age 28 (identical twin), who is healthy and has two sons, ages 3 and 18 months, who are also healthy.    25 year old sister who is healthy and has no children.    Parents-     Mother is 53 years old and is healthy with no hearing concerns.    Father is 56 years old and is healthy with no hearing concerns    Maternal Relatives-     Thirteen maternal aunt and uncles. One aunt is reported to have developed hearing loss later in life but this was attributed to an autoimmune condition she has. Maternal cousins are alive and well with no hearing concerns.     Maternal grandmother passed away in her late 80's due to old age    Maternal grandfather passed away in his mid-70's due to stroke    Paternal Relatives-     Seven paternal aunt and uncles and several cousins who are all reported to be alive and well with no hearing concerns.     Paternal grandmother is alive and well    Paternal grandfather passed away in his 50's due to colon cancer    Family history is otherwise largely non-contributory. No other reported history of hearing loss, vision loss, kidney disease, heart conditions, developmental delay/ID/autism, or birth defects. Maternal ancestry is [place] and [place] and paternal ancestry is Italian, Spanish, and [place]. Consanguinity was denied.     Genetic Counseling  Discussion:  For review, our bodies are made of cells that contain our chromosomes which are made up of long stretches of DNA containing our genes. Our genes serve as the instructions for our bodies to grow and function. We have two copies of each gene, one inherited from our mother and one inherited from our father.     Hearing loss (HL) can be due to environmental factors (prenatal infection,  infection, medication exposure, noise exposure), genetic factors, or a combination of both.  It can be important to know if there is an underlying genetic cause for an individual's hearing loss for several reasons. Approximately 60% of childhood-onset HL is thought to have genetic causes. These can be non-syndromic, meaning only the hearing is affected, or they can be syndromic, meaning other organ systems are involved. Some genetic syndromes that cause hearing loss can also cause vision loss, heart defects, kidney problems, neuropathy, and more. Identifying an underlying genetic cause for an individual's hearing loss can also help predict if the hearing loss will be stable or progress with time.     In general, the prevalence of HL increases with age. Approximately 40-50% of the population experiences hearing loss by age 75. The contribution of genetic causes to adult-onset HL is less clear, but it is likely that a significant portion of adult-onset HL is likely cause or influenced by genetic factors. The estimated diagnostic yield of genetic testing for adult-onset HL is  between 18% and 35%.    Most genetic forms of HL are inherited in an autosomal recessive inheritance patter. Autosomal recessive means an individual needs two likely pathogenic/pathogenic variants, one on each copy of the gene, in order to be affected with the condition. When an individual only has one variant in the gene, they are considered a carrier for the condition. When both parents are carriers for the same recessive condition, there is a  "1 in 4 (25%) chance of having an affected child, a 1 in 2 (50%) chance of having a child who is an unaffected carrier, and a 1 in 4 (25%) chance of having a child who is neither affected nor a carrier with each pregnancy.     Less commonly, genetic HL is inherited in an autosomal dominant or X-linked inheritance pattern. Autosomal dominant means an individual needs a single pathogenic/likely pathogenic variant  on one copy of the gene in order to be affected. Individuals who have a dominant condition have a 1 in 2 (50%) chance of passing their variant and the condition to each child. X-linked means the gene is on the X chromosome. Because males have one X chromosome and one Y chromosome, they will be affected if their X chromosome gene has a pathogenic variant. Because females have two X chromosomes, if one of their genes has a variant, they have a \"back-up\" copy of the gene so they may be more mildly affected or unaffected. Female carriers of X-linked conditions have a 50% chance of passing their pathogenic variant to each child regardless of the child's sex. Males who are affected with X-linked conditions will pass their pathogenic variant to each of their female children and none of their male children. Even more rarely, genetic HL can be caused by pathogenic variants in the mitochondrial DNA (mtDNA) which is only inherited from the mother.    Therefore, identifying the underlying genetic cause for an individual's hearing loss will also provide information about recurrence risk for a child's parents or the affected individuals future children.     We reviewed the availability of genetic testing via Next Generation Sequencing (NGS) for analysis of genes known to be associated with hearing loss (nonysndromic and syndromic), with the aim of determining what condition is causing Bridgett's symptoms. We discussed the [facility] Comprehensive Deafness Panel which will analyze 224 different genes.    We went on to discuss the " details, limitations, and possible outcomes of NGS. In particular, we discussed that there are three possible results from NGS:    Negative: meaning normal or no mutations are identified in the genes that were tested/sequenced    Positive: meaning a mutation that is known to be associated with a particular set of symptoms is identified    Variant of uncertain significance (VUS): meaning a change in the DNA sequence of a particular gene was seen but there is not enough information or data yet to know if it explains the symptoms. If a VUS is identified, testing of other relatives may be helpful to provide clarification.  In most cases, identification of a VUS does not confirm a diagnosis and does not result in any clinically actionable recommendations.    We discussed the potential benefits of genetic testing and why this genetic testing is medically indicated. A positive result will help determine the etiology of the hearing loss noted in Bridgett and may help predict progression of hearing loss and/or may guide the medical management for her, especially if a syndromic cause is found. Also, if a genetic cause is found for Bridgett's hearing loss, it will give us a more accurate risk assessment for other family members, particularly Bridgett's son and any future children.    Next Generation Sequencing is a well established technology utilized by all molecular genetic labs throughout the country for identifying disease-causing mutations in various genes.  Next Generation Sequencing is currently the standard of care for genetic testing of single genes. The recommended testing for Bridgett is DIAGNOSTIC testing, and it is NOT investigational.    Bridgett consented to genetic testing today. [facility] will conduct a benefits investigation to determine potential cost of testing. If the estimated cost is >$100, Bridgett will receive a phone call or text message with the estimated cost and payment options and can decide if she would like to  proceed. If the estimated cost is <$100, [facility] will initiate the testing. I will call the Bridgett with the results about 2-3 weeks after the testing begins.    It was a pleasure meeting Bridgett today. She was encouraged to reach out to me if she has any further questions.     Plan:  1. [facility] Comprehensive Deafness Panel  2. Bridgett will be mailed a buccal kit for sample collection. I will call her with results about 2-3 weeks after the lab receives her sample      Ramila Moody MS, [facility]  Licensed Genetic Counselor   [facility]  Phone: [phone]  Fax: [phone]    Time spent in consultation face to face was approximately 22 minutes.

## 2023-02-03 ENCOUNTER — VIRTUAL VISIT (OUTPATIENT)
Dept: PEDIATRICS | Facility: CLINIC | Age: 23
End: 2023-02-03
Attending: OTOLARYNGOLOGY
Payer: COMMERCIAL

## 2023-02-03 VITALS — BODY MASS INDEX: 22.35 KG/M2 | WEIGHT: 165 LBS | HEIGHT: 72 IN

## 2023-02-03 DIAGNOSIS — H90.3 BILATERAL SENSORINEURAL HEARING LOSS: Primary | ICD-10-CM

## 2023-02-03 DIAGNOSIS — Z71.83 ENCOUNTER FOR NONPROCREATIVE GENETIC COUNSELING: ICD-10-CM

## 2023-02-03 PROCEDURE — 96040 HC GENETIC COUNSELING, EACH 30 MINUTES: CPT | Mod: GT,95 | Performed by: GENETIC COUNSELOR, MS

## 2023-02-03 ASSESSMENT — PAIN SCALES - GENERAL: PAINLEVEL: NO PAIN (0)

## 2023-02-03 NOTE — LETTER
"2/3/2023      RE: Radha Chahal  [address] [facility] [address]     Dear Colleague,    Thank you for the opportunity to participate in the care of your patient, Radha Chahal, at the [facility] at [facility]. Please see a copy of my visit note below.    Bridgett is a 22 year old who is being evaluated via a billable video visit.      How would you like to obtain your AVS? MyChart  If the video visit is dropped, the invitation should be resent by: Text to cell phone: [phone]  Will anyone else be joining your video visit? Yvonne Dueñas    GENETIC COUNSELING CONSULTATION NOTE    Date of visit: 02/03/23    Presenting Information:   Radha \"Bridgett\" Fela is a 22 year old female referred to the [facility] due to bilateral sensorineural hearing loss and tinnitus. She was seen for a genetic counseling appointment at the request of her ENT physician Dr. Baptiste today.    Per Dr. Saldana's evaluation with Bridgett on 1/24/23, Bridgett has had decreased hearing along with bilateral tinnitus and ear pressure for about 2 years which started during pregnancy. She may have had symptoms prior to her pregnancy although this became much more pronounced after becoming pregnant and she denies hearing issues in her teenage years. Bridgett reports that she noticed a change when she had a harder time hearing and distinguishing lyrics when listening to music. Please refer to Dr. Baptiste note for further details of Bridgett's history that was gathered at this visit.     Bridgett denies any history of infection, cardiac, kidney, or thyroid issues. No vision concerns, does not wear glasses. Bridgett reports that she has had a sensitive stomach since around age 8 and has IBS. She reports that she had some kidney pain during her pregnancy with her son. She thinks she may have had a kidney ultrasound but could not recall and it " has not been a concern again since her pregnancy.    Birth History:   No reported complications or known maternal exposures or illnesses during the pregnancy with Bridgett. Her mother told her she had a very normal pregnancy.     Imaging:   B 22  IMPRESSION:   1.  Normal MRI of the internal auditory canals and labyrinthine structures.   2.  Unremarkable brain MRI with no finding for acute infarct, no parenchymal signal abnormality, and no abnormal parenchymal enhancement.    Family History: A three generation pedigree was obtained and scanned into the electronic medical record. The relevant portions are described below:      Children-     One son, 15 months old, who is healthy. He passed [place]    Siblings-     Sister, age 28 (identical twin), who is healthy and has three children: a 6 month old daughter who is healthy, a 2 year old son who is healthy, and a 5 year old son who was born with a hold in his heart or a collapsed valve he is otherwise health now    Sister, age 28 (identical twin), who is healthy and has two sons, ages 3 and 18 months, who are also healthy.    25 year old sister who is healthy and has no children.    Parents-     Mother is 53 years old and is healthy with no hearing concerns.    Father is 56 years old and is healthy with no hearing concerns    Maternal Relatives-     Thirteen maternal aunt and uncles. One aunt is reported to have developed hearing loss later in life but this was attributed to an autoimmune condition she has. Maternal cousins are alive and well with no hearing concerns.     Maternal grandmother passed away in her late 80's due to old age    Maternal grandfather passed away in his mid-70's due to stroke    Paternal Relatives-     Seven paternal aunt and uncles and several cousins who are all reported to be alive and well with no hearing concerns.     Paternal grandmother is alive and well    Paternal grandfather passed away in his 50's due to colon  cancer    Family history is otherwise largely non-contributory. No other reported history of hearing loss, vision loss, kidney disease, heart conditions, developmental delay/ID/autism, or birth defects. Maternal ancestry is Russian and [place]ian and paternal ancestry is Mong[place]ian, [place], and [place]. Consanguinity was denied.     Genetic Counseling Discussion:  For review, our bodies are made of cells that contain our chromosomes which are made up of long stretches of DNA containing our genes. Our genes serve as the instructions for our bodies to grow and function. We have two copies of each gene, one inherited from our mother and one inherited from our father.     Hearing loss (HL) can be due to environmental factors (prenatal infection,  infection, medication exposure, noise exposure), genetic factors, or a combination of both.  It can be important to know if there is an underlying genetic cause for an individual's hearing loss for several reasons. Approximately 60% of childhood-onset HL is thought to have genetic causes. These can be non-syndromic, meaning only the hearing is affected, or they can be syndromic, meaning other organ systems are involved. Some genetic syndromes that cause hearing loss can also cause vision loss, heart defects, kidney problems, neuropathy, and more. Identifying an underlying genetic cause for an individual's hearing loss can also help predict if the hearing loss will be stable or progress with time.     In general, the prevalence of HL increases with age. Approximately 40-50% of the population experiences hearing loss by age 75. The contribution of genetic causes to adult-onset HL is less clear, but it is likely that a significant portion of adult-onset HL is likely cause or influenced by genetic factors. The estimated diagnostic yield of genetic testing for adult-onset HL is  between 18% and 35%.    Most genetic forms of HL are inherited in an autosomal recessive inheritance  "fadumo. Autosomal recessive means an individual needs two likely pathogenic/pathogenic variants, one on each copy of the gene, in order to be affected with the condition. When an individual only has one variant in the gene, they are considered a carrier for the condition. When both parents are carriers for the same recessive condition, there is a 1 in 4 (25%) chance of having an affected child, a 1 in 2 (50%) chance of having a child who is an unaffected carrier, and a 1 in 4 (25%) chance of having a child who is neither affected nor a carrier with each pregnancy.     Less commonly, genetic HL is inherited in an autosomal dominant or X-linked inheritance pattern. Autosomal dominant means an individual needs a single pathogenic/likely pathogenic variant  on one copy of the gene in order to be affected. Individuals who have a dominant condition have a 1 in 2 (50%) chance of passing their variant and the condition to each child. X-linked means the gene is on the X chromosome. Because males have one X chromosome and one Y chromosome, they will be affected if their X chromosome gene has a pathogenic variant. Because females have two X chromosomes, if one of their genes has a variant, they have a \"back-up\" copy of the gene so they may be more mildly affected or unaffected. Female carriers of X-linked conditions have a 50% chance of passing their pathogenic variant to each child regardless of the child's sex. Males who are affected with X-linked conditions will pass their pathogenic variant to each of their female children and none of their male children. Even more rarely, genetic HL can be caused by pathogenic variants in the mitochondrial DNA (mtDNA) which is only inherited from the mother.    Therefore, identifying the underlying genetic cause for an individual's hearing loss will also provide information about recurrence risk for a child's parents or the affected individuals future children.     We reviewed the " availability of genetic testing via Next Generation Sequencing (NGS) for analysis of genes known to be associated with hearing loss (nonysndromic and syndromic), with the aim of determining what condition is causing Bridgett's symptoms. We discussed the [employer] Comprehensive Deafness Panel which will analyze 224 different genes.    We went on to discuss the details, limitations, and possible outcomes of NGS. In particular, we discussed that there are three possible results from NGS:    Negative: meaning normal or no mutations are identified in the genes that were tested/sequenced    Positive: meaning a mutation that is known to be associated with a particular set of symptoms is identified    Variant of uncertain significance (VUS): meaning a change in the DNA sequence of a particular gene was seen but there is not enough information or data yet to know if it explains the symptoms. If a VUS is identified, testing of other relatives may be helpful to provide clarification.  In most cases, identification of a VUS does not confirm a diagnosis and does not result in any clinically actionable recommendations.    We discussed the potential benefits of genetic testing and why this genetic testing is medically indicated. A positive result will help determine the etiology of the hearing loss noted in Bridgett and may help predict progression of hearing loss and/or may guide the medical management for her, especially if a syndromic cause is found. Also, if a genetic cause is found for Bridgett's hearing loss, it will give us a more accurate risk assessment for other family members, particularly Bridgett's son and any future children.    Next Generation Sequencing is a well established technology utilized by all molecular genetic labs throughout the country for identifying disease-causing mutations in various genes.  Next Generation Sequencing is currently the standard of care for genetic testing of single genes. The recommended testing  for Bridgett is DIAGNOSTIC testing, and it is NOT investigational.    Bridgett consented to genetic testing today. [facility] will conduct a benefits investigation to determine potential cost of testing. If the estimated cost is >$100, Bridgett will receive a phone call or text message with the estimated cost and payment options and can decide if she would like to proceed. If the estimated cost is <$100, [facility] will initiate the testing. I will call the Bridgett with the results about 2-3 weeks after the testing begins.    It was a pleasure meeting Bridgett today. She was encouraged to reach out to me if she has any further questions.     Plan:  1. [facility] Comprehensive Deafness Panel  2. Bridgett will be mailed a buccal kit for sample collection. I will call her with results about 2-3 weeks after the lab receives her sample      Ramila Moody MS, [facility]  Licensed Genetic Counselor   [facility]  Phone: [phone]  Fax: [phone]    Time spent in consultation face to face was approximately 22 minutes.

## 2023-02-13 ENCOUNTER — OFFICE VISIT (OUTPATIENT)
Dept: FAMILY MEDICINE | Facility: CLINIC | Age: 23
End: 2023-02-13
Payer: COMMERCIAL

## 2023-02-13 VITALS
TEMPERATURE: 97.8 F | WEIGHT: 169 LBS | SYSTOLIC BLOOD PRESSURE: 106 MMHG | BODY MASS INDEX: 22.89 KG/M2 | HEART RATE: 86 BPM | OXYGEN SATURATION: 100 % | HEIGHT: 72 IN | DIASTOLIC BLOOD PRESSURE: 68 MMHG

## 2023-02-13 DIAGNOSIS — R10.13 ABDOMINAL PAIN, EPIGASTRIC: Primary | ICD-10-CM

## 2023-02-13 LAB
BASOPHILS # BLD AUTO: 0 10E3/UL (ref 0–0.2)
BASOPHILS NFR BLD AUTO: 0 %
EOSINOPHIL # BLD AUTO: 0.3 10E3/UL (ref 0–0.7)
EOSINOPHIL NFR BLD AUTO: 3 %
ERYTHROCYTE [DISTWIDTH] IN BLOOD BY AUTOMATED COUNT: 12.4 % (ref 10–15)
HCT VFR BLD AUTO: 41.7 % (ref 35–47)
HGB BLD-MCNC: 13.8 G/DL (ref 11.7–15.7)
IMM GRANULOCYTES # BLD: 0 10E3/UL
IMM GRANULOCYTES NFR BLD: 0 %
LYMPHOCYTES # BLD AUTO: 2.8 10E3/UL (ref 0.8–5.3)
LYMPHOCYTES NFR BLD AUTO: 35 %
MCH RBC QN AUTO: 32.4 PG (ref 26.5–33)
MCHC RBC AUTO-ENTMCNC: 33.1 G/DL (ref 31.5–36.5)
MCV RBC AUTO: 98 FL (ref 78–100)
MONOCYTES # BLD AUTO: 0.5 10E3/UL (ref 0–1.3)
MONOCYTES NFR BLD AUTO: 6 %
NEUTROPHILS # BLD AUTO: 4.4 10E3/UL (ref 1.6–8.3)
NEUTROPHILS NFR BLD AUTO: 55 %
PLATELET # BLD AUTO: 313 10E3/UL (ref 150–450)
RBC # BLD AUTO: 4.26 10E6/UL (ref 3.8–5.2)
WBC # BLD AUTO: 8 10E3/UL (ref 4–11)

## 2023-02-13 PROCEDURE — 36415 COLL VENOUS BLD VENIPUNCTURE: CPT | Performed by: FAMILY MEDICINE

## 2023-02-13 PROCEDURE — 83690 ASSAY OF LIPASE: CPT | Performed by: FAMILY MEDICINE

## 2023-02-13 PROCEDURE — 82150 ASSAY OF AMYLASE: CPT | Performed by: FAMILY MEDICINE

## 2023-02-13 PROCEDURE — 85025 COMPLETE CBC W/AUTO DIFF WBC: CPT | Mod: QW | Performed by: FAMILY MEDICINE

## 2023-02-13 PROCEDURE — 80053 COMPREHEN METABOLIC PANEL: CPT | Performed by: FAMILY MEDICINE

## 2023-02-13 PROCEDURE — 99214 OFFICE O/P EST MOD 30 MIN: CPT | Performed by: FAMILY MEDICINE

## 2023-02-13 PROCEDURE — 86140 C-REACTIVE PROTEIN: CPT | Performed by: FAMILY MEDICINE

## 2023-02-13 RX ORDER — OMEPRAZOLE 40 MG/1
40 CAPSULE, DELAYED RELEASE ORAL DAILY
Qty: 30 CAPSULE | Refills: 1 | Status: SHIPPED | OUTPATIENT
Start: 2023-02-13 | End: 2023-03-27

## 2023-02-13 NOTE — PROGRESS NOTES
Clinical Decision Making:    At the end of the encounter, I discussed results, diagnosis, medications. Discussed red flags for immediate return to clinic/ER, as well as indications for follow up if no improvement. Patient understood and agreed to plan. Patient was stable for discharge.      ICD-10-CM    1. Abdominal pain, epigastric  R10.13 CBC with platelets and differential     CRP, inflammation     Comprehensive metabolic panel (BMP + Alb, Alk Phos, ALT, AST, Total. Bili, TP)     Amylase     Lipase     Helicobacter pylori Antigen Stool     omeprazole (PRILOSEC) 40 MG DR capsule     CBC with platelets and differential     CRP, inflammation     Comprehensive metabolic panel (BMP + Alb, Alk Phos, ALT, AST, Total. Bili, TP)     Amylase     Lipase     Helicobacter pylori Antigen Stool        Water  Rowan, easy to digest foods  Avoid dairy  Soups, cooked vegetables  Avoid spicy    CBC is within normal limits.  Labs pending include CMP, amylase, lipase, CRP and H. pylori    We will have her start taking omeprazole 40 mg daily  Discussed dietary recommendations    Discussed that if she develops severe abdominal pain that she should proceed to the emergency room, otherwise recheck later this week or early next week      There are no Patient Instructions on file for this visit.   No follow-ups on file.      chief complaint    HPI:  Radha Chahal is a 22 year old female who presents today complaining of epigastric abdominal pain for 3 or 4 days.  It feels like a pinch type pain or sometimes it feels like there is a rock sitting in her stomach.  It will radiate directly to her back.  She has no diarrhea.  She has mild constipation but otherwise pretty normal stools.  Her stomach feels worse if it is completely empty or if it is very full.  Water seems to be okay but all other beverages tend to bother her stomach.  She has tried Tums and Pepto-Bismol but they did not seem to be helpful.  She does have some nausea in the  "mornings but no vomiting.  She has had no fevers or chills.  No myalgias.  She does have a lot of stress  She is nursing her 15-month-old  The pain has been there for 3 or 4 days  She had no new foods or eating out before this pain started    History obtained from the patient.    Problem List:  2021: Encounter for triage in pregnant patient  2021: Pregnancy  2021:  uterine contractions, antepartum, third trimester  2021: Lab test positive for detection of COVID-19 virus  2016: Tension headache  2016: Migraine with aura, not intractable, without status   migrainosus  2016-10: Dysmenorrhea  2013: SOB (shortness of breath) on exertion  2013-10: Concussion      Past Medical History:   Diagnosis Date     Hard of hearing     pt states she has been hard of hearing for 3 years       Social History     Tobacco Use     Smoking status: Never     Smokeless tobacco: Never   Substance Use Topics     Alcohol use: Never       Review of systems  negative except listed in HPI    Vitals:    23 1646   BP: 106/68   BP Location: Right arm   Patient Position: Sitting   Cuff Size: Adult Regular   Pulse: 86   Temp: 97.8  F (36.6  C)   SpO2: 100%   Weight: 76.7 kg (169 lb)   Height: 1.854 m (6' 1\")       Physical Exam  Vitals noted and within normal limits  General she is alert, oriented, and in no acute distress  Heart has a regular rate and rhythm with no murmurs  Lungs are clear to auscultation bilaterally  Abdomen has normal bowel sounds and is soft and nondistended.  There is tenderness in the epigastric and umbilical areas.  There is no tenderness in the left lower quadrant, right lower quadrant or right upper quadrant.  No guarding.        Answers for HPI/ROS submitted by the patient on 2023  How many servings of fruits and vegetables do you eat daily?: 2-3  On average, how many sweetened beverages do you drink each day (Examples: soda, juice, sweet tea, etc.  Do NOT count diet or artificially " sweetened beverages)?: 1  How many minutes a day do you exercise enough to make your heart beat faster?: 20 to 29  How many days a week do you exercise enough to make your heart beat faster?: 4  How many days per week do you miss taking your medication?: 0  What is the reason for your visit today?: stomach pain  When did your symptoms begin?: 3-7 days ago  What are your symptoms?: pinch & pain in stomach  How would you describe these symptoms?: Moderate  Are your symptoms:: Staying the same  Have you had these symptoms before?: No  Is there anything that makes you feel worse?: not eating then right after eating  Is there anything that makes you feel better?: while eating

## 2023-02-14 LAB
ALBUMIN SERPL BCG-MCNC: 4.4 G/DL (ref 3.5–5.2)
ALP SERPL-CCNC: 84 U/L (ref 35–104)
ALT SERPL W P-5'-P-CCNC: 16 U/L (ref 10–35)
AMYLASE SERPL-CCNC: 63 U/L (ref 28–100)
ANION GAP SERPL CALCULATED.3IONS-SCNC: 8 MMOL/L (ref 7–15)
AST SERPL W P-5'-P-CCNC: 23 U/L (ref 10–35)
BILIRUB SERPL-MCNC: 0.2 MG/DL
BUN SERPL-MCNC: 12.4 MG/DL (ref 6–20)
CALCIUM SERPL-MCNC: 9.3 MG/DL (ref 8.6–10)
CHLORIDE SERPL-SCNC: 108 MMOL/L (ref 98–107)
CREAT SERPL-MCNC: 0.81 MG/DL (ref 0.51–0.95)
CRP SERPL-MCNC: 12 MG/L
DEPRECATED HCO3 PLAS-SCNC: 24 MMOL/L (ref 22–29)
GFR SERPL CREATININE-BSD FRML MDRD: >90 ML/MIN/1.73M2
GLUCOSE SERPL-MCNC: 83 MG/DL (ref 70–99)
LIPASE SERPL-CCNC: 40 U/L (ref 13–60)
POTASSIUM SERPL-SCNC: 4.6 MMOL/L (ref 3.4–5.3)
PROT SERPL-MCNC: 7.1 G/DL (ref 6.4–8.3)
SODIUM SERPL-SCNC: 140 MMOL/L (ref 136–145)

## 2023-02-16 PROCEDURE — 87338 HPYLORI STOOL AG IA: CPT | Performed by: FAMILY MEDICINE

## 2023-02-20 LAB — H PYLORI AG STL QL IA: NEGATIVE

## 2023-03-08 ENCOUNTER — TELEPHONE (OUTPATIENT)
Dept: CONSULT | Facility: CLINIC | Age: 23
End: 2023-03-08

## 2023-03-08 NOTE — TELEPHONE ENCOUNTER
I spoke with Bridgett and we discussed the results of her genetic testing. Bridgett had genetic testing that analyzed 224 different genes associated with non-syndromic and syndromic hearing loss. Overall, the results of this test are essentially negative at this time, meaning no pathogenic variants (mutations) were found to explain her hearing loss.     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 A VUS means a change in the gene was found but there is not enough data or information yet to know if that change is harmful to the gene and causes a particular condition (pathogenic) or if is harmless (benign) change.     The SIX5 gene currently has no well-established disease association, but there is preliminary evidence that suggests that pathogenic variants in this gene cause a condition called brachio-safia-renal syndrome. This is a condition that disrupts the development of tissues in the neck and causes malformations of the ears and kidneys which can lead to hearing loss and/or kidney problems. The features of this syndrome can be variable among affected individuals.     Because Bridgett's variant in the SIX5 gene is currently classified as a VUS, we do not have sufficient evidence to determine if this variant may be contributing to her hearing loss. Bridgett does not currently appear to have the other associated symptoms of brachio-safia-renal syndrome at this time either which is reassuring. If the lab updates the classification of this variant with time, they will notify us and we will update Bridgett.     Overall, Bridgett's testing did not find an underlying genetic cause for her hearing loss thus far. This genetic test cannot completely rule out an underlying genetic cause for her hearing loss because this test was not looking at every gene and it is likely that there are other genes  that can cause hearing loss that are yet to be discovered. For this reason, the recommendation is to follow-up with genetic counseling again in 3 years to see if updated testing is available at that time.     In the meantime, I will mail Bridgett a copy of this summary and a copy of her genetic test report to keep for her records. She can reach out to me anytime with additional genetics questions.     Ramila Moody MS, [facility]  Licensed Genetic Counselor  [facility]  Phone: [phone]  Fax: [phone]

## 2023-03-27 ENCOUNTER — OFFICE VISIT (OUTPATIENT)
Dept: FAMILY MEDICINE | Facility: CLINIC | Age: 23
End: 2023-03-27
Payer: COMMERCIAL

## 2023-03-27 VITALS
BODY MASS INDEX: 22.46 KG/M2 | DIASTOLIC BLOOD PRESSURE: 64 MMHG | HEART RATE: 88 BPM | RESPIRATION RATE: 14 BRPM | TEMPERATURE: 97 F | WEIGHT: 170.2 LBS | OXYGEN SATURATION: 99 % | SYSTOLIC BLOOD PRESSURE: 118 MMHG

## 2023-03-27 DIAGNOSIS — N30.01 ACUTE CYSTITIS WITH HEMATURIA: ICD-10-CM

## 2023-03-27 DIAGNOSIS — R30.9 PAINFUL URINATION: Primary | ICD-10-CM

## 2023-03-27 LAB
ALBUMIN UR-MCNC: 30 MG/DL
APPEARANCE UR: CLEAR
BACTERIA #/AREA URNS HPF: ABNORMAL /HPF
BILIRUB UR QL STRIP: ABNORMAL
COLOR UR AUTO: YELLOW
GLUCOSE UR STRIP-MCNC: NEGATIVE MG/DL
HGB UR QL STRIP: ABNORMAL
KETONES UR STRIP-MCNC: ABNORMAL MG/DL
LEUKOCYTE ESTERASE UR QL STRIP: NEGATIVE
MUCOUS THREADS #/AREA URNS LPF: PRESENT /LPF
NITRATE UR QL: NEGATIVE
PH UR STRIP: 5.5 [PH] (ref 5–7)
RBC #/AREA URNS AUTO: ABNORMAL /HPF
SP GR UR STRIP: >=1.03 (ref 1–1.03)
SQUAMOUS #/AREA URNS AUTO: ABNORMAL /LPF
UROBILINOGEN UR STRIP-ACNC: 0.2 E.U./DL
WBC #/AREA URNS AUTO: ABNORMAL /HPF

## 2023-03-27 PROCEDURE — 99213 OFFICE O/P EST LOW 20 MIN: CPT

## 2023-03-27 PROCEDURE — 81001 URINALYSIS AUTO W/SCOPE: CPT

## 2023-03-27 RX ORDER — SULFAMETHOXAZOLE/TRIMETHOPRIM 800-160 MG
1 TABLET ORAL 2 TIMES DAILY
Qty: 14 TABLET | Refills: 0 | Status: SHIPPED | OUTPATIENT
Start: 2023-03-27 | End: 2023-04-03

## 2023-03-27 ASSESSMENT — ENCOUNTER SYMPTOMS
HEMATOLOGIC/LYMPHATIC NEGATIVE: 1
EYES NEGATIVE: 1
DIFFICULTY URINATING: 1
GASTROINTESTINAL NEGATIVE: 1
PSYCHIATRIC NEGATIVE: 1
ALLERGIC/IMMUNOLOGIC NEGATIVE: 1
MUSCULOSKELETAL NEGATIVE: 1
DYSURIA: 1
ENDOCRINE NEGATIVE: 1
CARDIOVASCULAR NEGATIVE: 1
NEUROLOGICAL NEGATIVE: 1
RESPIRATORY NEGATIVE: 1
HEMATURIA: 1
CONSTITUTIONAL NEGATIVE: 1

## 2023-03-27 NOTE — PROGRESS NOTES
Assessment & Plan     Acute cystitis with hematuria  UA positive for urinary tract infection.  No fevers, no CVA tenderness on exam.  Patient instructed she will be called when culture for sensitivity is available should antibiotic needs to be adjusted.  Instructed on red flags and when to seek immediate medical care, instructed to return to clinic if symptoms not improving in 3 days.  Patient given printed information on urinary tract infections as she does not remember ever having one before.  - sulfamethoxazole-trimethoprim (BACTRIM DS) 800-160 MG tablet; Take 1 tablet by mouth 2 times daily for 7 days  - UA macro with reflex to Microscopic and Culture - Clinc Collect  - UA Microscopic with Reflex to Culture               KLAUDIA Flores CNP  [facility]    Nico Urias is a 22 year old, presenting for the following health issues:  UTI (X 2-3 days.)    Additional Questions 3/27/2023   Roomed by Rsoalinda STARR CMA   Accompanied by None     Alexandra is a 22-year-old female ambulatory to clinic accompanied by self.  She is here with complaints of urinary frequency, blood in urine, mild discomfort with urination and lower abdominal pain.  She does not remember having a urinary tract infection previously.  He is breast-feeding a 16-month-old.  She is actively trying to get pregnant but no concerns for pregnancy today.  LMP 14 of March. Has not had sexual intercourse since LMP. Has some mild lower abdominal pain and lower back pain. Does not report flank pain. No fevers.  No sores or vulvar lesions per patient.     UTI  This is a new problem. The current episode started in the past 7 days. The problem occurs daily.   History of Present Illness       Reason for visit:  Possible uti  Symptom onset:  1-3 days ago  Symptoms include:  Feeling of having to pee all the time & blood in urine  Symptom intensity:  Moderate  Symptom progression:  Staying the same  Had these symptoms before:   No  What makes it worse:  No  What makes it better:  No    She eats 2-3 servings of fruits and vegetables daily.She consumes 0 sweetened beverage(s) daily.She exercises with enough effort to increase her heart rate 20 to 29 minutes per day.  She exercises with enough effort to increase her heart rate 4 days per week.   She is taking medications regularly.               Review of Systems   Constitutional: Negative.    Eyes: Negative.    Respiratory: Negative.    Cardiovascular: Negative.    Gastrointestinal: Negative.    Endocrine: Negative.    Genitourinary: Positive for difficulty urinating, dysuria and hematuria.   Musculoskeletal: Negative.    Skin: Negative.    Allergic/Immunologic: Negative.    Neurological: Negative.    Hematological: Negative.    Psychiatric/Behavioral: Negative.             Objective    /64   Pulse 88   Temp 97  F (36.1  C)   Resp 14   Wt 77.2 kg (170 lb 3.2 oz)   LMP 03/14/2023 (Exact Date)   SpO2 99%   Breastfeeding No   BMI 22.46 kg/m    Body mass index is 22.46 kg/m .  Physical Exam

## 2023-06-04 ENCOUNTER — HEALTH MAINTENANCE LETTER (OUTPATIENT)
Age: 23
End: 2023-06-04

## 2023-08-11 ENCOUNTER — OFFICE VISIT (OUTPATIENT)
Dept: FAMILY MEDICINE | Facility: CLINIC | Age: 23
End: 2023-08-11
Payer: COMMERCIAL

## 2023-08-11 VITALS
OXYGEN SATURATION: 99 % | RESPIRATION RATE: 14 BRPM | TEMPERATURE: 98 F | SYSTOLIC BLOOD PRESSURE: 102 MMHG | DIASTOLIC BLOOD PRESSURE: 67 MMHG | WEIGHT: 162 LBS | BODY MASS INDEX: 21.37 KG/M2 | HEART RATE: 67 BPM

## 2023-08-11 DIAGNOSIS — H10.31 ACUTE CONJUNCTIVITIS OF RIGHT EYE, UNSPECIFIED ACUTE CONJUNCTIVITIS TYPE: Primary | ICD-10-CM

## 2023-08-11 PROCEDURE — 99213 OFFICE O/P EST LOW 20 MIN: CPT | Performed by: PHYSICIAN ASSISTANT

## 2023-08-11 RX ORDER — POLYMYXIN B SULFATE AND TRIMETHOPRIM 1; 10000 MG/ML; [USP'U]/ML
1-2 SOLUTION OPHTHALMIC EVERY 4 HOURS
Qty: 10 ML | Refills: 0 | Status: SHIPPED | OUTPATIENT
Start: 2023-08-11 | End: 2023-08-18

## 2023-08-11 NOTE — PATIENT INSTRUCTIONS
If you have fevers, vision change that is persistent (not just with mucus), eyeball pain that is persistent, not improving sx, redness, swelling of the structures around the eye go directly to the ER for evaluation.

## 2023-08-11 NOTE — PROGRESS NOTES
Assessment & Plan     Acute conjunctivitis of right eye, unspecified acute conjunctivitis type  Patient given Polytrim as ordered.  I did discuss with patient red flag symptoms including deep boring headache, significant photophobia and painful EOMs, fevers which could represent retrobulbar abscess.  She has no lid swelling, redness. No preseptal cellulitis and though she does have a HA does have a HA history.  .  Clinically appearance is most consistent with conjunctivitis though if she does not have improvement with the antibiotic or if she has any worsening of her symptoms where she develops any of the above should be seen immediately in the emergency room.  She relays understanding.  - trimethoprim-polymyxin b (POLYTRIM) 02484-5.1 UNIT/ML-% ophthalmic solution  Dispense: 10 mL; Refill: 0       Whitley Johnston PA-C  [facility]    Subjective     Bridgett is a 22 year old female who presents to clinic today for the following health issues:  Chief Complaint   Patient presents with    Eye Problem     Rt eye red mattery started last night     HPI  22 year old female who presents to urgent care with concerns re: R eye redness, discharge, mattering.    Gritty sensation, mattery yellow discharge.  Copious    Nasal congestion.    No fevers.    No known exposures.    Vision initally but when wipes mucus away better and this morning vision better.    Does have a HA, has a hx of  tension HA.  No photophobia.   No hx of autoimmune disorders.    No fevers.          Review of Systems  Constitutional, HEENT, cardiovascular, pulmonary, gi and gu systems are negative, except as otherwise noted.      Objective    /67   Pulse 67   Temp 98  F (36.7  C) (Oral)   Resp 14   Wt 73.5 kg (162 lb)   SpO2 99%   BMI 21.37 kg/m    Physical Exam   Nad appears well  PERRLA  EOMI but mild discomfort with EOM.  No proptosis.    Conjunctiva injected without circumcorneal flare.    Small amount of  purulent discharge note.    Lids without redness or swelling.

## 2024-07-27 ENCOUNTER — HEALTH MAINTENANCE LETTER (OUTPATIENT)
Age: 24
End: 2024-07-27

## 2025-08-10 ENCOUNTER — HEALTH MAINTENANCE LETTER (OUTPATIENT)
Age: 25
End: 2025-08-10